# Patient Record
Sex: FEMALE | Race: OTHER | HISPANIC OR LATINO
[De-identification: names, ages, dates, MRNs, and addresses within clinical notes are randomized per-mention and may not be internally consistent; named-entity substitution may affect disease eponyms.]

---

## 2021-09-29 PROBLEM — Z00.00 ENCOUNTER FOR PREVENTIVE HEALTH EXAMINATION: Status: ACTIVE | Noted: 2021-09-29

## 2021-10-06 ENCOUNTER — APPOINTMENT (OUTPATIENT)
Dept: NEUROLOGY | Facility: CLINIC | Age: 56
End: 2021-10-06

## 2023-02-22 ENCOUNTER — NON-APPOINTMENT (OUTPATIENT)
Age: 58
End: 2023-02-22

## 2023-02-23 ENCOUNTER — NON-APPOINTMENT (OUTPATIENT)
Age: 58
End: 2023-02-23

## 2023-02-28 ENCOUNTER — APPOINTMENT (OUTPATIENT)
Dept: COLORECTAL SURGERY | Facility: CLINIC | Age: 58
End: 2023-02-28
Payer: MEDICARE

## 2023-02-28 VITALS
DIASTOLIC BLOOD PRESSURE: 89 MMHG | BODY MASS INDEX: 26.03 KG/M2 | HEIGHT: 66 IN | HEART RATE: 112 BPM | SYSTOLIC BLOOD PRESSURE: 142 MMHG | TEMPERATURE: 98.4 F | WEIGHT: 162 LBS

## 2023-02-28 DIAGNOSIS — N81.6 RECTOCELE: ICD-10-CM

## 2023-02-28 PROCEDURE — 46600 DIAGNOSTIC ANOSCOPY SPX: CPT

## 2023-02-28 PROCEDURE — 99203 OFFICE O/P NEW LOW 30 MIN: CPT | Mod: 25

## 2023-02-28 RX ORDER — DEXLANSOPRAZOLE 60 MG/1
60 CAPSULE, DELAYED RELEASE ORAL
Refills: 0 | Status: ACTIVE | COMMUNITY

## 2023-02-28 RX ORDER — AMLODIPINE BESYLATE AND BENAZEPRIL HYDROCHLORIDE 5; 10 MG/1; MG/1
5-10 CAPSULE ORAL
Refills: 0 | Status: ACTIVE | COMMUNITY

## 2023-02-28 RX ORDER — CYCLOBENZAPRINE HCL 10 MG
10 TABLET ORAL
Refills: 0 | Status: ACTIVE | COMMUNITY

## 2023-02-28 RX ORDER — ESTRADIOL 1 MG/1
1 TABLET ORAL
Refills: 0 | Status: ACTIVE | COMMUNITY

## 2023-02-28 NOTE — ASSESSMENT
[FreeTextEntry1] : Exam findings and diagnosis were discussed at length with patient.\par Recommendations including increased fiber intake, adequate daily hydration were discussed.\par Avoid constipation and diarrhea, avoid pushing/straining.\par Referral given for pelvic floor physical therapy and biofeedback provided.\par Referral to UROGYN also provided.\par All questions answered, patient expressed understanding and is agreeable to this plan.\par Australian-speaking staff present for duration of patient encounter\par

## 2023-02-28 NOTE — ASSESSMENT
[FreeTextEntry1] : Exam findings and diagnosis were discussed at length with patient.\par Recommendations including increased fiber intake, adequate daily hydration were discussed.\par Avoid constipation and diarrhea, avoid pushing/straining.\par Referral given for pelvic floor physical therapy and biofeedback provided.\par Referral to UROGYN also provided.\par All questions answered, patient expressed understanding and is agreeable to this plan.\par Israeli-speaking staff present for duration of patient encounter\par

## 2023-02-28 NOTE — PHYSICAL EXAM
[FreeTextEntry1] : Medical assistant present for duration of physical examination\par \par General no acute distress, alert and oriented\par Psych calm, pleasant demeanor, responding appropriately to questions\par Nonlabored breathing\par Ambulating without assistance\par Skin normal color and pigment, no visible lesions or rashes\par \par Anorectal Exam:\par Inspection no erythema, induration or fluctuance, no skin excoriation, no fissure, right anterior external hemorrhoid\par KENNA nontender, no masses palpated, no blood on gloved finger, Rectocele anteriorly\par \par Procedure: Anoscopy\par \par Pre procedure Diagnosis: rectocele\par Post procedure Diagnosis: rectocele\par Anesthesia: none\par Estimated blood loss: none\par Specimen: none\par Complications: none\par \par Consent obtained. Anoscopy was performed by passing a lighted anoscope with lubricant jelly into the anal canal and the entire anal mucosal surface was inspected. Findings included no fissure, mild internal hemorrhoids, no visible masses or lesions in anal canal\par \par Patient tolerated examination and procedure well.\par \par \par

## 2023-02-28 NOTE — HISTORY OF PRESENT ILLNESS
[FreeTextEntry1] : 58yo female presents for initial evaluation\par \par Referred by PCP Dr Ruelas - outside records from PCP reviewed\par Reason for referral "difficulty defecating"\par \par PMH HTN, menopause, anemia, diverticulitis, GERD, DUMONT, fibroids\par Meds amlodipine, benazepril, dexlansoprazole, cyclobenzaprine, estradiol\par \par PSH partial hysterectomy, right oophorectomy (2002), completion hysterectomy (2014), cholecystectomy , surgery for diverticulitis (ruptured diverticuli) (2007), left knee arthroscopy (2011), s/p knee injection w/ hyaluronic acid and nerve block w/ lidocaine (2/27/23)\par \par Denies family history of CRC or IBD\par \par H/o NSVDx3\par \par Never a  smoker\par \par Colonoscopy 8/17/2020 per PCP records - nonbleeding internal hemorrhoids\par \par Pt reports while in [facility], [place] last week had diarrhea and some BRB on TP and felt protruding tissue that reduced on its own. She experienced some discomfort, but denies pain, itching or burning. Now has tissue present, but denies discomfort. She has been prescribed hemorrhoidal cream in the past year w/ improvement in symptoms, but felt strange inserting applicator and "felt air." She is not using any topical creams at present\par \par Also admits to pressing on perineum to assist w/ BMs which she has been doing for at least the last two years. She saw GYN regarding the complaint, but told her everything was fine. She has not seen UROL or done pelvic floor PT. Reports she had change in bowel habits after diverticulosis surgery\par \par BH: 1-2 times per day, mostly formed. \par Reports adequate dietary fiber intake\par Was taking fiber supplement, but felt it worsened her bloating and gastritis symptoms so she stopped. Last EGD 4/2022 and on dexlansoprazole\par Denies ASA/NSAID use

## 2023-02-28 NOTE — HISTORY OF PRESENT ILLNESS
[FreeTextEntry1] : 56yo female presents for initial evaluation\par \par Referred by PCP Dr Ruelas - outside records from PCP reviewed\par Reason for referral "difficulty defecating"\par \par PMH HTN, menopause, anemia, diverticulitis, GERD, DUMONT, fibroids\par Meds amlodipine, benazepril, dexlansoprazole, cyclobenzaprine, estradiol\par \par PSH partial hysterectomy, right oophorectomy (2002), completion hysterectomy (2014), cholecystectomy , surgery for diverticulitis (ruptured diverticuli) (2007), left knee arthroscopy (2011), s/p knee injection w/ hyaluronic acid and nerve block w/ lidocaine (2/27/23)\par \par Denies family history of CRC or IBD\par \par H/o NSVDx3\par \par Never a  smoker\par \par Colonoscopy 8/17/2020 per PCP records - nonbleeding internal hemorrhoids\par \par Pt reports while in [facility], [place] last week had diarrhea and some BRB on TP and felt protruding tissue that reduced on its own. She experienced some discomfort, but denies pain, itching or burning. Now has tissue present, but denies discomfort. She has been prescribed hemorrhoidal cream in the past year w/ improvement in symptoms, but felt strange inserting applicator and "felt air." She is not using any topical creams at present\par \par Also admits to pressing on perineum to assist w/ BMs which she has been doing for at least the last two years. She saw GYN regarding the complaint, but told her everything was fine. She has not seen UROL or done pelvic floor PT. Reports she had change in bowel habits after diverticulosis surgery\par \par BH: 1-2 times per day, mostly formed. \par Reports adequate dietary fiber intake\par Was taking fiber supplement, but felt it worsened her bloating and gastritis symptoms so she stopped. Last EGD 4/2022 and on dexlansoprazole\par Denies ASA/NSAID use

## 2023-07-19 ENCOUNTER — APPOINTMENT (OUTPATIENT)
Dept: ORTHOPEDIC SURGERY | Facility: CLINIC | Age: 58
End: 2023-07-19
Payer: MEDICARE

## 2023-07-19 VITALS — WEIGHT: 162 LBS | RESPIRATION RATE: 16 BRPM | BODY MASS INDEX: 25.43 KG/M2 | HEIGHT: 67 IN

## 2023-07-19 DIAGNOSIS — S83.241S OTHER TEAR OF MEDIAL MENISCUS, CURRENT INJURY, RIGHT KNEE, SEQUELA: ICD-10-CM

## 2023-07-19 DIAGNOSIS — Z78.9 OTHER SPECIFIED HEALTH STATUS: ICD-10-CM

## 2023-07-19 DIAGNOSIS — Z72.3 LACK OF PHYSICAL EXERCISE: ICD-10-CM

## 2023-07-19 DIAGNOSIS — M17.11 UNILATERAL PRIMARY OSTEOARTHRITIS, RIGHT KNEE: ICD-10-CM

## 2023-07-19 DIAGNOSIS — S83.242A OTHER TEAR OF MEDIAL MENISCUS, CURRENT INJURY, LEFT KNEE, INITIAL ENCOUNTER: ICD-10-CM

## 2023-07-19 DIAGNOSIS — Z86.39 PERSONAL HISTORY OF OTHER ENDOCRINE, NUTRITIONAL AND METABOLIC DISEASE: ICD-10-CM

## 2023-07-19 PROCEDURE — 73564 X-RAY EXAM KNEE 4 OR MORE: CPT | Mod: LT

## 2023-07-19 PROCEDURE — 99214 OFFICE O/P EST MOD 30 MIN: CPT

## 2023-07-19 RX ORDER — AMLODIPINE BESYLATE 5 MG/1
TABLET ORAL
Refills: 0 | Status: ACTIVE | COMMUNITY

## 2023-07-19 RX ORDER — ICOSAPENT ETHYL 500 MG/1
CAPSULE ORAL
Refills: 0 | Status: ACTIVE | COMMUNITY

## 2023-07-19 RX ORDER — DICLOFENAC SODIUM 75 MG/1
TABLET, DELAYED RELEASE ORAL
Refills: 0 | Status: ACTIVE | COMMUNITY

## 2023-07-19 RX ORDER — ESTRADIOL 10 UG/1
TABLET, FILM COATED VAGINAL
Refills: 0 | Status: ACTIVE | COMMUNITY

## 2023-07-26 ENCOUNTER — RESULT REVIEW (OUTPATIENT)
Age: 58
End: 2023-07-26

## 2023-07-26 ENCOUNTER — OUTPATIENT (OUTPATIENT)
Dept: OUTPATIENT SERVICES | Facility: HOSPITAL | Age: 58
LOS: 1 days | End: 2023-07-26
Payer: MEDICARE

## 2023-07-26 ENCOUNTER — APPOINTMENT (OUTPATIENT)
Dept: MRI IMAGING | Facility: CLINIC | Age: 58
End: 2023-07-26

## 2023-07-26 PROCEDURE — 73721 MRI JNT OF LWR EXTRE W/O DYE: CPT | Mod: 26,LT

## 2023-08-02 ENCOUNTER — TRANSCRIPTION ENCOUNTER (OUTPATIENT)
Age: 58
End: 2023-08-02

## 2023-08-09 ENCOUNTER — APPOINTMENT (OUTPATIENT)
Dept: ORTHOPEDIC SURGERY | Facility: CLINIC | Age: 58
End: 2023-08-09
Payer: MEDICARE

## 2023-08-09 VITALS
HEART RATE: 79 BPM | SYSTOLIC BLOOD PRESSURE: 153 MMHG | DIASTOLIC BLOOD PRESSURE: 82 MMHG | WEIGHT: 162 LBS | HEIGHT: 67 IN | BODY MASS INDEX: 25.43 KG/M2 | OXYGEN SATURATION: 97 %

## 2023-08-09 DIAGNOSIS — Z01.818 ENCOUNTER FOR OTHER PREPROCEDURAL EXAMINATION: ICD-10-CM

## 2023-08-09 DIAGNOSIS — R82.90 UNSPECIFIED ABNORMAL FINDINGS IN URINE: ICD-10-CM

## 2023-08-09 DIAGNOSIS — R79.1 ABNORMAL COAGULATION PROFILE: ICD-10-CM

## 2023-08-09 DIAGNOSIS — Z22.322 CARRIER OR SUSPECTED CARRIER OF METHICILLIN RESISTANT STAPHYLOCOCCUS AUREUS: ICD-10-CM

## 2023-08-09 DIAGNOSIS — E83.10 DISORDER OF IRON METABOLISM, UNSPECIFIED: ICD-10-CM

## 2023-08-09 DIAGNOSIS — M17.12 UNILATERAL PRIMARY OSTEOARTHRITIS, LEFT KNEE: ICD-10-CM

## 2023-08-09 PROCEDURE — 99214 OFFICE O/P EST MOD 30 MIN: CPT

## 2023-08-16 ENCOUNTER — APPOINTMENT (OUTPATIENT)
Dept: CT IMAGING | Facility: HOSPITAL | Age: 58
End: 2023-08-16

## 2023-08-16 ENCOUNTER — APPOINTMENT (OUTPATIENT)
Dept: ORTHOPEDIC SURGERY | Facility: CLINIC | Age: 58
End: 2023-08-16
Payer: MEDICARE

## 2023-08-16 ENCOUNTER — LABORATORY RESULT (OUTPATIENT)
Age: 58
End: 2023-08-16

## 2023-08-16 ENCOUNTER — NON-APPOINTMENT (OUTPATIENT)
Age: 58
End: 2023-08-16

## 2023-08-16 ENCOUNTER — OUTPATIENT (OUTPATIENT)
Dept: OUTPATIENT SERVICES | Facility: HOSPITAL | Age: 58
LOS: 1 days | End: 2023-08-16
Payer: MEDICARE

## 2023-08-16 VITALS
OXYGEN SATURATION: 98 % | BODY MASS INDEX: 25.43 KG/M2 | DIASTOLIC BLOOD PRESSURE: 82 MMHG | HEART RATE: 83 BPM | HEIGHT: 67 IN | SYSTOLIC BLOOD PRESSURE: 116 MMHG | WEIGHT: 162 LBS

## 2023-08-16 DIAGNOSIS — Z82.61 FAMILY HISTORY OF ARTHRITIS: ICD-10-CM

## 2023-08-16 DIAGNOSIS — M79.18 MYALGIA, OTHER SITE: ICD-10-CM

## 2023-08-16 DIAGNOSIS — M54.6 PAIN IN THORACIC SPINE: ICD-10-CM

## 2023-08-16 LAB
ALBUMIN SERPL ELPH-MCNC: 4.3 G/DL
ALP BLD-CCNC: 89 U/L
ALT SERPL-CCNC: 18 U/L
ANION GAP SERPL CALC-SCNC: 12 MMOL/L
APTT BLD: 31.2 SEC
AST SERPL-CCNC: 21 U/L
BILIRUB SERPL-MCNC: 0.2 MG/DL
BUN SERPL-MCNC: 7 MG/DL
CALCIUM SERPL-MCNC: 9.8 MG/DL
CHLORIDE SERPL-SCNC: 106 MMOL/L
CO2 SERPL-SCNC: 23 MMOL/L
CREAT SERPL-MCNC: 0.71 MG/DL
EGFR: 97 ML/MIN/1.73M2
GLUCOSE SERPL-MCNC: 81 MG/DL
INR PPP: 1.05 RATIO
MRSA SPEC QL CULT: POSITIVE
POTASSIUM SERPL-SCNC: 5 MMOL/L
PREALB SERPL NEPH-MCNC: 18 MG/DL
PROT SERPL-MCNC: 7.4 G/DL
PT BLD: 11.8 SEC
SODIUM SERPL-SCNC: 142 MMOL/L
STAPH AUREUS (SA): POSITIVE
TRANSFERRIN SERPL-MCNC: 329 MG/DL

## 2023-08-16 PROCEDURE — 72110 X-RAY EXAM L-2 SPINE 4/>VWS: CPT

## 2023-08-16 PROCEDURE — 73700 CT LOWER EXTREMITY W/O DYE: CPT | Mod: 26,LT

## 2023-08-16 PROCEDURE — 73700 CT LOWER EXTREMITY W/O DYE: CPT

## 2023-08-16 PROCEDURE — 99215 OFFICE O/P EST HI 40 MIN: CPT

## 2023-08-16 NOTE — CONSULT LETTER
[FreeTextEntry2] : Dr. Figueroa [FreeTextEntry1] : I had the privilege of evaluating your patient today. I have enclosed my office note for your reference. If you have any questions, concerns or further input, please do not hesitate to contact me.  Thank you for allowing mew to participate in the care of your patient.  Sincerely, Yariel Shepard MD

## 2023-08-16 NOTE — REVIEW OF SYSTEMS
[Joint Pain] : joint pain [Joint Swelling] : joint swelling [Negative] : Heme/Lymph [Arthralgia] : arthralgia [Joint Stiffness] : joint stiffness [Fever] : no fever [Chills] : no chills

## 2023-08-16 NOTE — PHYSICAL EXAM
[de-identified] : General: Patient is a well-appearing female in no apparent distress. She is alert and oriented x 3. Vital signs are within normal limits.  No sign of fevers or infectious symptoms.   Hygiene: Excellent HEENT: Atraumatic with no asymmetry.  Neck motion is normal. No overt hearing deficits. Pulmonary: Breathing comfortably. Gastrointestinal: Is obese.   Psych: Responding appropriately with appropriate affect. Patient does not demonstrate tangential thought, perseveration or anxiety. Vascular: No rashes or obvious skin abnormalities in either lower extremity. Capillary refill is <2sec. Good distal perfusion. Neurovascular: Varicose absent. 5/5 strength with hip flexion, knee extension, ankle dorsiflexion, ankle plantar flexion, and EHL. Sensation is intact over the lower extremity in L2-S1 nerve distributions. 2+ dorsalis pedis and posterior tibial pulses   [de-identified] : Gait: She walks with a severely antalgic gait which is stifflegged on the left side  Inspection: Knee appears unremarkable No ulcerations observed  Palpation: No tenderness to palpation Tenderness to palpation of the medial joint line  Range of Motion:		 Right: 0-125			 Left: 0-125  Painful ROM on the left. Bilateral knee stable to varus/valgus and ant/post stress  Both hips are stable no sign of dislocation or subluxation on exam with good pain free ROM.   [de-identified] : Previous x-rays and MRI reviewed.  The x-rays show some mild to moderate degenerative changes in the medial compartment femoral compartment.  MRI shows some focal areas of grade 4 changes along with meniscal pathology in the medial compartment with some focal areas of grade 4 changes in the patellofemoral compartment as well.

## 2023-08-16 NOTE — DISCUSSION/SUMMARY
Date of service: 02/14/22       Chief Complaint   Patient presents with   • Blood Pressure          HPI:   Maria Del Rosario Bradford is a 62 year old with hypertension, asthma, obesity, history of hysterectomy, allergic rhinitis who presents for healthcare maintenance as well as follow-up on uncontrolled hypertension.  Patient has seen other fellow colleagues and currently has been on regimen of metoprolol, losartan, hydrochlorothiazide, amlodipine.  Blood pressure elevated over the last few readings, initially 142/85 today, 124/78 on recheck.  Patient states that she has been taking blood pressure medication, has been trying to avoid salty snacks in her diet.  Has not had recent lab work, agreeable to completing lab work today.    States that she drinks a lot of water, stays hydrated.  Discussed need for consistent exercise and regimen.    Surgical history: Hysterectomy    Medical history: As mentioned today    Social history: Non-smoker    Family history: Strong family history of hypertension diabetes      Healthcare maintenance:  Annual preventive lab work: Due, ordered  Mammogram: Due, ordered  Colon cancer screening: Due, referred  COVID-19 vaccination: Advised to complete series    There are no further complaints.     Review:    Current Outpatient Medications   Medication Sig Dispense Refill   • albuterol 108 (90 Base) MCG/ACT inhaler Inhale 2 puffs into the lungs every 4 hours as needed for Shortness of Breath or Wheezing. 1 each 1   • hydrochlorothiazide (HYDRODIURIL) 25 MG tablet Take 1 tablet by mouth daily. 30 tablet 0   • metoPROLOL succinate (TOPROL-XL) 25 MG 24 hr tablet Take 1 tablet by mouth daily. 30 tablet 0   • amLODIPine (NORVASC) 10 MG tablet Take 1 tablet by mouth daily. 30 tablet 1   • losartan (COZAAR) 100 MG tablet TAKE 1 TABLET BY MOUTH DAILY 90 tablet 0     No current facility-administered medications for this visit.     ALLERGIES:   Allergen Reactions   • Chocolate   (Food Or Med) HIVES     Past  Medical History:   Diagnosis Date   • Essential (primary) hypertension         HYSTERECTOMY                                                Family History   Problem Relation Age of Onset   • Diabetes Mother    • Hypertension Mother    • Heart disease Mother    • Stroke Mother    • Diabetes Father    • Hypertension Father    • Heart disease Father    • Stroke Father        ROS:  Review of Systems   Constitutional: Negative for chills and fever.   HENT: Negative for sore throat.    Respiratory: Negative for cough, shortness of breath and wheezing.    Cardiovascular: Negative for chest pain.   Gastrointestinal: Negative for constipation, diarrhea and vomiting.   Genitourinary: Negative for frequency.   Musculoskeletal: Negative for joint pain.   Skin: Negative for rash.   Neurological: Negative for speech change and weakness.           Visit Vitals  /78 (BP Location: LUE - Left upper extremity)   Pulse 82   Temp 97.1 °F (36.2 °C) (Tympanic)   Resp 20   Ht 4' 11\" (1.499 m)   Wt 70 kg (154 lb 5.2 oz)   SpO2 97%   BMI 31.17 kg/m²        Physical Exam  Constitutional:       Appearance: Normal appearance.   HENT:      Head: Normocephalic and atraumatic.      Right Ear: Tympanic membrane and ear canal normal.      Left Ear: Tympanic membrane and ear canal normal.      Nose: Congestion present.      Mouth/Throat:      Pharynx: Oropharyngeal exudate present.      Neck: Normal range of motion and neck supple.   Cardiovascular:      Rate and Rhythm: Normal rate and regular rhythm.      Pulses: Normal pulses.      Heart sounds: Normal heart sounds.   Pulmonary:      Effort: Pulmonary effort is normal.      Breath sounds: Normal breath sounds.   Abdominal:      General: Abdomen is flat.      Palpations: Abdomen is soft.   Musculoskeletal:         General: No swelling.      Right lower leg: No edema.      Left lower leg: No edema.   Skin:     Capillary Refill: Capillary refill takes less than 2 seconds.   Neurological:       General: No focal deficit present.      Mental Status: She is alert and oriented to person, place, and time.         Assessment/Plan:  Assessment     Maria Del Rosario was seen today for blood pressure.    Diagnoses and all orders for this visit:    Healthcare maintenance  -     Cancel: CBC WITH DIFFERENTIAL; Future  -     Cancel: COMPREHENSIVE METABOLIC PANEL; Future  -     Cancel: GLYCOHEMOGLOBIN; Future  -     Cancel: LIPID PANEL WITH REFLEX; Future  -     Cancel: THYROID STIMULATING HORMONE REFLEX; Future  -     THYROID STIMULATING HORMONE REFLEX; Future  -     LIPID PANEL WITH REFLEX; Future  -     GLYCOHEMOGLOBIN; Future  -     COMPREHENSIVE METABOLIC PANEL; Future  -     CBC WITH DIFFERENTIAL; Future    Screen for colon cancer  -     SERVICE TO GASTROENTEROLOGY    Encounter for screening mammogram for malignant neoplasm of breast  -     MAMMO SCREENING BILATERAL W CHATA; Future    Uncontrolled hypertension  -     Cancel: THYROID STIMULATING HORMONE REFLEX; Future  -     Cancel: MICROALBUMIN URINE RANDOM; Future  -     hydrochlorothiazide (HYDRODIURIL) 25 MG tablet; Take 1 tablet by mouth daily.  -     metoPROLOL succinate (TOPROL-XL) 25 MG 24 hr tablet; Take 1 tablet by mouth daily.  -     MICROALBUMIN URINE RANDOM; Future  -     THYROID STIMULATING HORMONE REFLEX; Future    Class 1 obesity due to excess calories without serious comorbidity with body mass index (BMI) of 33.0 to 33.9 in adult  --Establish regimen of daily cardiovascular activity  --Follow heart healthy low carbohydrate, low-salt diet  --Track calories using smartphone henna or manually  --Discussed our goal is gradual weight loss that is sustainable rather than quick temporary weight loss  --Emphasized importance of hydration  --Can consider nutritionist evaluation  --Should keep track of weights at home on a weekly basis    Need for hepatitis C screening test  -     Cancel: HEPATITIS C ANTIBODY WITH REFLEX; Future  -     HEPATITIS C ANTIBODY WITH REFLEX;  [de-identified] : Right knee arthritis.  I had a long discussion with the patient regarding knee arthritis / degenerative disease and treatment options. We reviewed the nature and etiology of degenerative knee degenerative disease.  We discussed the spectrum of symptomatic treatments. Our discussion included the use of appropriate exercises, activity modifications, weight reduction and maintenance, appropriate medication use, use of assistive devices, role of injections and avoidance of high impact activities.  Given the clinical symptoms, physical exam and imaging findings, we discussed the possibility of knee replacement surgery.  We reviewed the elective quality of life nature of the procedure and the details of the surgery, approach and the implants used, using the model  in the clinic. This included discussion of the material and fixation options. We also discussed the risks, benefits and expected and potential outcomes at length.  The details of those risks are below.  Category 1 includes risks that could occur in association with any operation. They include heart attack, stroke, blood clot and pulmonary embolism, wound infection, transfusion reaction, drug allergy, and complications related to anesthesia. This list is not intended to be complete but only to convey a broad range of general medical risks to be aware of.  Blood clots may lead to a block in circulation. A blood clot that completely blocks a large artery can lead to gangrene. If this happens an amputation may be required. Blood clots in leg veins lead to pain and swelling. If part of the clot breaks off it can travel to the brain and lead to a stroke. A clot can also travel to the lung, resulting in a pulmonary embolism. Medication after surgery will minimize but not eliminate these complications.  Category 2 is a list of risks and complications specifically related to knee replacement. This list is not complete but is intended to make the patient aware of the kinds of implant-related risks and complications that might occur.    1. If the device gets loose or wears out further surgery may be required to revise the prosthesis. 2. If an infection develops, further surgery to washout the joint, remove or replace parts, or insert an antibiotic spacer may be required 3. Muscle, Tendon, Nerve and blood vessel damage may result as a consequence of mobilization of the joint or dissection near these structures. These injuries can lead to weakness, numbness and paralysis. The damage may be temporary or permanent. The recovery process can be long and may require further procedures.   4. Dislocations and instability may also require further surgery.   5. Periprosthetic fracture requiring revision surgery. 6. Leg length discrepancy  7. Stiffness 8. Wound complications requiring either local wound care, revision surgery, or plastic surgery consultation  9. Chronic pain requiring pain management  At this point in time the patient feels would like to proceed with surgery.  We did review the relative risks and benefits of partial versus total knee replacement given the nature of her pain as well as the findings in the patellofemoral joint on MRI she would like a total knee arthroplasty .  She understands that that will relieve pain related to her knee arthritis but not due to other etiologies including her spine.  Given she does have significant low back pain she does have pain that radiates from the knee all the way to the foot we will have her evaluated by the spine service to ensure that they do not think the spine is the major contributor to her pain.  We discussed the procedure and recovery at length using the model in the clinic is noted.  We would plan a home discharge she will have support from her daughter who is with her today and lives with her.  Will plan robotic assistance with preoperative CT scan as well.  She expressed understanding of all this and desire to proceed.  All questions were answered.  Plan: Left robotically assisted total knee arthroplasty  Equipment: [id number] knee; AMINAH Robot  Evaluations: Spine; PCP; [facility]  Addendum (8/16/23) I reviewed x-rays and MRI with the sports team for the patient (Dr. Avilez).  He confirmed that he feels there is too much arthritis to consider arthroscopic treatment of any meniscal pathology and this would likely worsen the situation and not benefit her.  Plan as above. Future    Mild intermittent asthma without complication  -     albuterol 108 (90 Base) MCG/ACT inhaler; Inhale 2 puffs into the lungs every 4 hours as needed for Shortness of Breath or Wheezing.        Huong Shipman D.O.  Primary Care   02/14/22  [facility]  [address] [place] [id number]

## 2023-08-16 NOTE — HISTORY OF PRESENT ILLNESS
[de-identified] : ASHLIE GUTIÉRREZ is a pleasant 58 year female .  She is seen today with her daughter who provided initial  at her request as well as with an official ..  She is referred from the sports medicine service.  ASHLIE GUTIÉRREZ complains of left knee pain over the past several years. Pain is located in the medial region of the knee and radiates to laterally. She denies prior injury or trauma. She notes the pain is worse with activity- walking/taking stairs and rates it 10 out of 10 at its worst. She is limping due to the pain. She denies mechanical symptoms including catching, locking, buckling.  She does have rest/nighttime pain. She  reports difficulty taking public transportation due to the excessive stairs. She has been treated non-surgically with ice/heat, physical therapy, anti-inflammatory medications (Advil/Aleve/Mobic), weight loss, activity modification, ambulatory assistive devices: cane/walker/crutches, and intra-articular injection (steroid/HA) (last was on) which lasted for a few week(s) but was not durable. The left/right knee pain significantly interferes with their ADLs and quality of life. Denies any fevers and chills.

## 2023-08-17 ENCOUNTER — NON-APPOINTMENT (OUTPATIENT)
Age: 58
End: 2023-08-17

## 2023-08-17 PROBLEM — M54.6 THORACIC BACK PAIN: Status: ACTIVE | Noted: 2023-08-17

## 2023-08-17 PROBLEM — M79.18 MYOFASCIAL PAIN SYNDROME OF LUMBAR SPINE: Status: ACTIVE | Noted: 2023-08-17

## 2023-08-17 LAB
APPEARANCE: ABNORMAL
BILIRUBIN URINE: NEGATIVE
BLOOD URINE: ABNORMAL
COLOR: YELLOW
GLUCOSE QUALITATIVE U: NEGATIVE MG/DL
KETONES URINE: NEGATIVE MG/DL
LEUKOCYTE ESTERASE URINE: NEGATIVE
NITRITE URINE: NEGATIVE
PH URINE: 5.5
PROTEIN URINE: NEGATIVE MG/DL
SPECIFIC GRAVITY URINE: 1.01
UROBILINOGEN URINE: 0.2 MG/DL

## 2023-08-17 NOTE — PHYSICAL EXAM
[Antalgic] : antalgic [UE/LE] : Sensory: Intact in bilateral upper & lower extremities [Knee] : patellar 2+ and symmetric bilaterally [Ankle] : ankle 2+ and symmetric bilaterally [Normal Touch] : sensation intact for touch [Normal Proprioception] : sensation intact for proprioception [Normal] : No costovertebral angle tenderness and no spinal tenderness [de-identified] : 4 view lumbar spine x-ray 8/16/2023 Ortho PACS: This space heights maintained.  Moderate hyper lordosis.  No evidence of instability or listhesis.  Coronal alignment is well-maintained.  Left leg length discrepancy approximately 1.5 cm longer than right.

## 2023-08-17 NOTE — ASSESSMENT
[FreeTextEntry1] : 58-year-old female with chronic myofascial type lower thoracic and lumbar pain without radiculopathy or neurologic abnormality or spinal instability

## 2023-08-17 NOTE — HISTORY OF PRESENT ILLNESS
[de-identified] : 58-year-old female presents as new patient for evaluation of thoracic and lumbar back pain.  She describes several years of atraumatic onset of a aching stiffness that affects the lower thoracic region as well as the lumbar region diffusely.  This pain bothers her if she is walking for an extended period of time or occasionally if she is changing positions such as getting up from a chair or bending forward to pick something up off the ground.  She denies any radiating pain down the extremities or any associated numbness tingling or weakness.  Does have significant left knee pain the setting of advanced osteoarthritis as well as left medial heel pain consistent with plantar fasciitis. Has planned left total knee replacement with Dr. Shepard in the coming weeks

## 2023-08-17 NOTE — DISCUSSION/SUMMARY
[de-identified] : At this time I do feel like the limiting factor from a pain and functional status perspective is the symptomatic left knee osteoarthritis and have certainly recommended moving forward with a left total knee replacement as planned with Dr. Shepard.  I have not recommended any spinal precautions restrictions or additional work-up necessary prior to undergoing this procedure.  Do feel that correcting her left knee mechanics and pain with associated rehab is a large chance of helping her low back pain.  She does continue to bother her significantly after she recovers from her total knee replacement I recommended to follow-up to consider additional imaging and/or treatment as needed.  I have spent greater than 60 minutes preparing to see the patient, collecting relevant history, performing a thorough history and physical examination, counseling the patient regarding my findings ordering the appropriate therapies and tests, communicating with other relevant healthcare professionals, documenting my encounter and coordinating care.

## 2023-08-22 ENCOUNTER — NON-APPOINTMENT (OUTPATIENT)
Age: 58
End: 2023-08-22

## 2023-08-31 RX ORDER — SODIUM CHLORIDE 9 MG/ML
1000 INJECTION, SOLUTION INTRAVENOUS
Refills: 0 | Status: DISCONTINUED | OUTPATIENT
Start: 2023-09-05 | End: 2023-09-08

## 2023-08-31 RX ORDER — MAGNESIUM HYDROXIDE 400 MG/1
30 TABLET, CHEWABLE ORAL DAILY
Refills: 0 | Status: DISCONTINUED | OUTPATIENT
Start: 2023-09-05 | End: 2023-09-08

## 2023-08-31 RX ORDER — ACETAMINOPHEN 500 MG
1000 TABLET ORAL EVERY 8 HOURS
Refills: 0 | Status: DISCONTINUED | OUTPATIENT
Start: 2023-09-05 | End: 2023-09-08

## 2023-08-31 RX ORDER — ONDANSETRON 8 MG/1
4 TABLET, FILM COATED ORAL EVERY 6 HOURS
Refills: 0 | Status: DISCONTINUED | OUTPATIENT
Start: 2023-09-05 | End: 2023-09-08

## 2023-08-31 RX ORDER — SENNA PLUS 8.6 MG/1
2 TABLET ORAL AT BEDTIME
Refills: 0 | Status: DISCONTINUED | OUTPATIENT
Start: 2023-09-05 | End: 2023-09-08

## 2023-08-31 RX ORDER — POLYETHYLENE GLYCOL 3350 17 G/17G
17 POWDER, FOR SOLUTION ORAL AT BEDTIME
Refills: 0 | Status: DISCONTINUED | OUTPATIENT
Start: 2023-09-05 | End: 2023-09-08

## 2023-08-31 RX ORDER — ASPIRIN/CALCIUM CARB/MAGNESIUM 324 MG
81 TABLET ORAL
Refills: 0 | Status: DISCONTINUED | OUTPATIENT
Start: 2023-09-06 | End: 2023-09-08

## 2023-08-31 RX ORDER — FAMOTIDINE 10 MG/ML
20 INJECTION INTRAVENOUS DAILY
Refills: 0 | Status: DISCONTINUED | OUTPATIENT
Start: 2023-09-05 | End: 2023-09-08

## 2023-08-31 RX ORDER — CELECOXIB 200 MG/1
200 CAPSULE ORAL EVERY 12 HOURS
Refills: 0 | Status: DISCONTINUED | OUTPATIENT
Start: 2023-09-05 | End: 2023-09-08

## 2023-09-01 RX ORDER — POVIDONE-IODINE 5 %
1 AEROSOL (ML) TOPICAL ONCE
Refills: 0 | Status: COMPLETED | OUTPATIENT
Start: 2023-09-05 | End: 2023-09-05

## 2023-09-01 NOTE — H&P ADULT - PROBLEM SELECTOR PLAN 1
Admit to Orthopaedic Service.  Presents today for elective Left TKR  Pt medically stable and cleared for procedure today by Dr. Puga

## 2023-09-01 NOTE — H&P ADULT - HISTORY OF PRESENT ILLNESS
59yo female with Left knee pain x     Presents today for elective Left Total Knee Arthroplasty w/ Dr. Shepard 57yo female with Left knee pain x chronic. Pt denies acute preceding trauma/injury. Pt takes tylenol as needed for her localized knee pain. She denies numbness/tingling/weakness of bilateral lower extremities. She does not ambulate with an assistive device at baseline. Denies DVT hx; denies CP, SOB, N/V, tactile fevers, calf pain.     Presents today for elective Left Total Knee Arthroplasty w/ Dr. Shepard   #[id number] Catherine for history/exam and consent

## 2023-09-01 NOTE — H&P ADULT - NSICDXPASTSURGICALHX_GEN_ALL_CORE_FT
PAST SURGICAL HISTORY:  H/O colectomy     History of cholecystectomy     History of partial hysterectomy     Uterine myoma

## 2023-09-01 NOTE — ASU PATIENT PROFILE, ADULT - FALL HARM RISK - UNIVERSAL INTERVENTIONS
Bed in lowest position, wheels locked, appropriate side rails in place/Call bell, personal items and telephone in reach/Instruct patient to call for assistance before getting out of bed or chair/Non-slip footwear when patient is out of bed/[place] to call system/Physically safe environment - no spills, clutter or unnecessary equipment/Purposeful Proactive Rounding/Room/bathroom lighting operational, light cord in reach

## 2023-09-01 NOTE — H&P ADULT - NSHPLABSRESULTS_GEN_ALL_CORE
Preop CBC, BMP, PT/PTT/INR, UA within normal limits- reviewed by medical clearance.  Cr: 0.71  Preop EKG: NSR, 73bpm, reviewed by medical clearance.  CXR: Within normal limits, per medical clearance.  MRSA swab positive  3M DOS Preop CBC, BMP, PT/PTT/INR, UA within normal limits- reviewed by medical clearance.  Cr: 0.71  Preop EKG: NSR, 73bpm, reviewed by medical clearance.  CXR: Within normal limits, per medical clearance.  MRSA swab positive   3M DOS

## 2023-09-01 NOTE — ASU PATIENT PROFILE, ADULT - NSICDXPASTMEDICALHX_GEN_ALL_CORE_FT
PAST MEDICAL HISTORY:  Anemia     Dyslipidemia     GERD (gastroesophageal reflux disease)     History of diverticulosis     HTN (hypertension)     Inflammatory arthropathy     Prediabetes

## 2023-09-01 NOTE — H&P ADULT - NSHPPHYSICALEXAM_GEN_ALL_CORE
Gen: Alert and oriented, NAD  MSK: Decreased ROM to left knee 2/2 pain     Rest of PE per medical clearance note Gen: Alert and oriented, NAD  MSK: Decreased ROM to left knee 2/2 pain   Skin warm and well perfused, no visible wounds/erythema/ecchymoses  EHL/FHL/TA/GS 5/5 motor strength bilaterally   SLT in tact to distal BLE   DP pulses palpable bilaterally   Remainder of PE per medical clearance note

## 2023-09-01 NOTE — H&P ADULT - NSICDXPASTMEDICALHX_GEN_ALL_CORE_FT
PAST MEDICAL HISTORY:  Anemia     Dyslipidemia     GERD (gastroesophageal reflux disease)     History of diverticulosis     HTN (hypertension)     Inflammatory arthropathy     Prediabetes     Primary osteoarthritis of left knee

## 2023-09-04 ENCOUNTER — TRANSCRIPTION ENCOUNTER (OUTPATIENT)
Age: 58
End: 2023-09-04

## 2023-09-05 ENCOUNTER — APPOINTMENT (OUTPATIENT)
Dept: ORTHOPEDIC SURGERY | Facility: HOSPITAL | Age: 58
End: 2023-09-05

## 2023-09-05 ENCOUNTER — INPATIENT (INPATIENT)
Facility: HOSPITAL | Age: 58
LOS: 2 days | Discharge: HOME HEALTH | DRG: 470 | End: 2023-09-08
Attending: SPECIALIST | Admitting: SPECIALIST
Payer: MEDICARE

## 2023-09-05 ENCOUNTER — TRANSCRIPTION ENCOUNTER (OUTPATIENT)
Age: 58
End: 2023-09-05

## 2023-09-05 ENCOUNTER — RESULT REVIEW (OUTPATIENT)
Age: 58
End: 2023-09-05

## 2023-09-05 VITALS
DIASTOLIC BLOOD PRESSURE: 85 MMHG | TEMPERATURE: 98 F | HEART RATE: 76 BPM | HEIGHT: 67 IN | WEIGHT: 156.75 LBS | SYSTOLIC BLOOD PRESSURE: 146 MMHG | OXYGEN SATURATION: 98 % | RESPIRATION RATE: 16 BRPM

## 2023-09-05 DIAGNOSIS — Z90.49 ACQUIRED ABSENCE OF OTHER SPECIFIED PARTS OF DIGESTIVE TRACT: Chronic | ICD-10-CM

## 2023-09-05 DIAGNOSIS — M17.12 UNILATERAL PRIMARY OSTEOARTHRITIS, LEFT KNEE: ICD-10-CM

## 2023-09-05 DIAGNOSIS — Z90.711 ACQUIRED ABSENCE OF UTERUS WITH REMAINING CERVICAL STUMP: Chronic | ICD-10-CM

## 2023-09-05 DIAGNOSIS — D25.9 LEIOMYOMA OF UTERUS, UNSPECIFIED: Chronic | ICD-10-CM

## 2023-09-05 PROCEDURE — S2900 ROBOTIC SURGICAL SYSTEM: CPT | Mod: NC

## 2023-09-05 PROCEDURE — 27447 TOTAL KNEE ARTHROPLASTY: CPT | Mod: LT

## 2023-09-05 PROCEDURE — 73560 X-RAY EXAM OF KNEE 1 OR 2: CPT | Mod: 26,LT

## 2023-09-05 DEVICE — MAKO BONE PIN 4MM X 110MM: Type: IMPLANTABLE DEVICE | Status: FUNCTIONAL

## 2023-09-05 DEVICE — INSERT TIB BEARING CS X3 SZ 2 9MM: Type: IMPLANTABLE DEVICE | Status: FUNCTIONAL

## 2023-09-05 DEVICE — IMP PATELLA ASYMMETRIC X3 29X9MM: Type: IMPLANTABLE DEVICE | Status: FUNCTIONAL

## 2023-09-05 DEVICE — MAKO BONE PIN 4MM X 140MM: Type: IMPLANTABLE DEVICE | Status: FUNCTIONAL

## 2023-09-05 DEVICE — FEMORAL CRUCIATE RETAINING: Type: IMPLANTABLE DEVICE | Status: FUNCTIONAL

## 2023-09-05 DEVICE — IMPLANTABLE DEVICE: Type: IMPLANTABLE DEVICE | Status: FUNCTIONAL

## 2023-09-05 DEVICE — BASEPLATE TIB UNIV TRIATHLON SZ 2: Type: IMPLANTABLE DEVICE | Status: FUNCTIONAL

## 2023-09-05 RX ORDER — KETOROLAC TROMETHAMINE 30 MG/ML
30 SYRINGE (ML) INJECTION ONCE
Refills: 0 | Status: DISCONTINUED | OUTPATIENT
Start: 2023-09-05 | End: 2023-09-05

## 2023-09-05 RX ORDER — TRAMADOL HYDROCHLORIDE 50 MG/1
25 TABLET ORAL EVERY 6 HOURS
Refills: 0 | Status: DISCONTINUED | OUTPATIENT
Start: 2023-09-05 | End: 2023-09-06

## 2023-09-05 RX ORDER — TRAMADOL HYDROCHLORIDE 50 MG/1
50 TABLET ORAL EVERY 6 HOURS
Refills: 0 | Status: DISCONTINUED | OUTPATIENT
Start: 2023-09-05 | End: 2023-09-06

## 2023-09-05 RX ORDER — CHLORHEXIDINE GLUCONATE 213 G/1000ML
1 SOLUTION TOPICAL ONCE
Refills: 0 | Status: COMPLETED | OUTPATIENT
Start: 2023-09-05 | End: 2023-09-05

## 2023-09-05 RX ORDER — CEFAZOLIN SODIUM 1 G
2000 VIAL (EA) INJECTION EVERY 8 HOURS
Refills: 0 | Status: COMPLETED | OUTPATIENT
Start: 2023-09-06 | End: 2023-09-06

## 2023-09-05 RX ORDER — ACETAMINOPHEN 500 MG
1000 TABLET ORAL ONCE
Refills: 0 | Status: COMPLETED | OUTPATIENT
Start: 2023-09-05 | End: 2023-09-05

## 2023-09-05 RX ADMIN — Medication 1 APPLICATION(S): at 11:21

## 2023-09-05 RX ADMIN — Medication 1000 MILLIGRAM(S): at 10:55

## 2023-09-05 RX ADMIN — Medication 30 MILLIGRAM(S): at 21:49

## 2023-09-05 RX ADMIN — CHLORHEXIDINE GLUCONATE 1 APPLICATION(S): 213 SOLUTION TOPICAL at 11:21

## 2023-09-05 RX ADMIN — Medication 1000 MILLIGRAM(S): at 22:48

## 2023-09-05 RX ADMIN — TRAMADOL HYDROCHLORIDE 50 MILLIGRAM(S): 50 TABLET ORAL at 20:46

## 2023-09-05 RX ADMIN — Medication 1000 MILLIGRAM(S): at 21:48

## 2023-09-05 RX ADMIN — Medication 30 MILLIGRAM(S): at 22:49

## 2023-09-05 NOTE — BRIEF OPERATIVE NOTE - NSICDXBRIEFPOSTOP_GEN_ALL_CORE_FT
POST-OP DIAGNOSIS:  Osteoarthritis of left knee 05-Sep-2023 19:13:32 s/p left TKA Shelli Jean Baptiste

## 2023-09-05 NOTE — PRE-ANESTHESIA EVALUATION ADULT - NSANTHADDINFOFT_GEN_ALL_CORE
Spinal / PNB for postop pain/ possible GA  R/B/A d/w patient including risks of parasthesia, HA, and nerve injury. All questions answered.

## 2023-09-05 NOTE — BRIEF OPERATIVE NOTE - NSICDXBRIEFPROCEDURE_GEN_ALL_CORE_FT
PROCEDURES:  Arthroplasty, knee, left, total, robot-assisted 05-Sep-2023 19:13:03  Shelli Jean Baptiste

## 2023-09-05 NOTE — PROGRESS NOTE ADULT - SUBJECTIVE AND OBJECTIVE BOX
Ortho Post Op Check    Procedure: L TKA  Surgeon: Devyn    Pt comfortable without complaints, pain controlled  Denies CP, SOB, N/V, numbness/tingling     Vital Signs Last 24 Hrs  T(C): 36.1 (09-05-23 @ 19:00), Max: 36.1 (09-05-23 @ 19:00)  T(F): 97 (09-05-23 @ 19:00), Max: 97 (09-05-23 @ 19:00)  HR: 62 (09-05-23 @ 20:15) (62 - 74)  BP: 150/71 (09-05-23 @ 20:15) (134/69 - 168/94)  BP(mean): 102 (09-05-23 @ 20:15) (94 - 126)  RR: 20 (09-05-23 @ 20:15) (13 - 24)  SpO2: 99% (09-05-23 @ 20:15) (94% - 99%)  I&O's Summary    05 Sep 2023 07:01  -  05 Sep 2023 21:34  --------------------------------------------------------  IN: 150 mL / OUT: 1800 mL / NET: -1650 mL        Physical Exam:  General: Pt Alert and oriented, NAD  LLE  DSG C/D/I, ace  Pulses: 2+ dp, pt pulses, wwp, cap refill <3 sec  Sensation: SILT in sural/saph/sp/dp/tibial distributions  Motor: EHL/FHL/TA/GS firing      Post-op X-Ray: Hardware in place, well aligned    A/P: 58yFemale s/p L TKA with Dr. Shepard on 9/5/23  - Stable  - Pain Control  - f/u AM labs  - DVT ppx: SCDs. asa  - Post op abx: periop ancef  - PT, WBS: WBAT  - Dispo: Pending PT    Aleksey Humphrey, PGY2  Orthopaedic Surgery  [phone]

## 2023-09-06 ENCOUNTER — TRANSCRIPTION ENCOUNTER (OUTPATIENT)
Age: 58
End: 2023-09-06

## 2023-09-06 LAB
ANION GAP SERPL CALC-SCNC: 11 MMOL/L — SIGNIFICANT CHANGE UP (ref 5–17)
BILIRUB SERPL-MCNC: 0.8 MG/DL — SIGNIFICANT CHANGE UP (ref 0.2–1.2)
BUN SERPL-MCNC: 10 MG/DL — SIGNIFICANT CHANGE UP (ref 7–23)
CALCIUM SERPL-MCNC: 9.3 MG/DL — SIGNIFICANT CHANGE UP (ref 8.4–10.5)
CHLORIDE SERPL-SCNC: 102 MMOL/L — SIGNIFICANT CHANGE UP (ref 96–108)
CO2 SERPL-SCNC: 23 MMOL/L — SIGNIFICANT CHANGE UP (ref 22–31)
CREAT SERPL-MCNC: 0.61 MG/DL — SIGNIFICANT CHANGE UP (ref 0.5–1.3)
EGFR: 102 ML/MIN/1.73M2 — SIGNIFICANT CHANGE UP
GLUCOSE SERPL-MCNC: 104 MG/DL — HIGH (ref 70–99)
HCT VFR BLD CALC: 33.6 % — LOW (ref 34.5–45)
HCV AB S/CO SERPL IA: 0.04 S/CO — SIGNIFICANT CHANGE UP
HCV AB SERPL-IMP: SIGNIFICANT CHANGE UP
HGB BLD-MCNC: 10.3 G/DL — LOW (ref 11.5–15.5)
INR BLD: 1.02 — SIGNIFICANT CHANGE UP (ref 0.85–1.18)
MCHC RBC-ENTMCNC: 24.4 PG — LOW (ref 27–34)
MCHC RBC-ENTMCNC: 30.7 GM/DL — LOW (ref 32–36)
MCV RBC AUTO: 79.6 FL — LOW (ref 80–100)
MELD SCORE WITH DIALYSIS: 21 POINTS — SIGNIFICANT CHANGE UP
MELD SCORE WITHOUT DIALYSIS: 7 POINTS — SIGNIFICANT CHANGE UP
NRBC # BLD: 0 /100 WBCS — SIGNIFICANT CHANGE UP (ref 0–0)
PLATELET # BLD AUTO: 272 K/UL — SIGNIFICANT CHANGE UP (ref 150–400)
POTASSIUM SERPL-MCNC: 3.8 MMOL/L — SIGNIFICANT CHANGE UP (ref 3.5–5.3)
POTASSIUM SERPL-SCNC: 3.8 MMOL/L — SIGNIFICANT CHANGE UP (ref 3.5–5.3)
PROTHROM AB SERPL-ACNC: 11.6 SEC — SIGNIFICANT CHANGE UP (ref 9.5–13)
RBC # BLD: 4.22 M/UL — SIGNIFICANT CHANGE UP (ref 3.8–5.2)
RBC # FLD: 17.2 % — HIGH (ref 10.3–14.5)
SODIUM SERPL-SCNC: 136 MMOL/L — SIGNIFICANT CHANGE UP (ref 135–145)
WBC # BLD: 7.98 K/UL — SIGNIFICANT CHANGE UP (ref 3.8–10.5)
WBC # FLD AUTO: 7.98 K/UL — SIGNIFICANT CHANGE UP (ref 3.8–10.5)

## 2023-09-06 PROCEDURE — 99221 1ST HOSP IP/OBS SF/LOW 40: CPT

## 2023-09-06 RX ORDER — OXYCODONE HYDROCHLORIDE 5 MG/1
5 TABLET ORAL EVERY 4 HOURS
Refills: 0 | Status: DISCONTINUED | OUTPATIENT
Start: 2023-09-06 | End: 2023-09-08

## 2023-09-06 RX ORDER — TRAMADOL HYDROCHLORIDE 50 MG/1
25 TABLET ORAL EVERY 4 HOURS
Refills: 0 | Status: DISCONTINUED | OUTPATIENT
Start: 2023-09-06 | End: 2023-09-06

## 2023-09-06 RX ORDER — INFLUENZA VIRUS VACCINE 15; 15; 15; 15 UG/.5ML; UG/.5ML; UG/.5ML; UG/.5ML
0.5 SUSPENSION INTRAMUSCULAR ONCE
Refills: 0 | Status: DISCONTINUED | OUTPATIENT
Start: 2023-09-06 | End: 2023-09-08

## 2023-09-06 RX ORDER — SODIUM CHLORIDE 9 MG/ML
1000 INJECTION INTRAMUSCULAR; INTRAVENOUS; SUBCUTANEOUS ONCE
Refills: 0 | Status: COMPLETED | OUTPATIENT
Start: 2023-09-06 | End: 2023-09-06

## 2023-09-06 RX ORDER — OXYCODONE HYDROCHLORIDE 5 MG/1
10 TABLET ORAL EVERY 4 HOURS
Refills: 0 | Status: DISCONTINUED | OUTPATIENT
Start: 2023-09-06 | End: 2023-09-08

## 2023-09-06 RX ORDER — AMLODIPINE BESYLATE 2.5 MG/1
5 TABLET ORAL DAILY
Refills: 0 | Status: DISCONTINUED | OUTPATIENT
Start: 2023-09-06 | End: 2023-09-06

## 2023-09-06 RX ORDER — AMLODIPINE BESYLATE 2.5 MG/1
5 TABLET ORAL DAILY
Refills: 0 | Status: DISCONTINUED | OUTPATIENT
Start: 2023-09-06 | End: 2023-09-08

## 2023-09-06 RX ORDER — HYDROMORPHONE HYDROCHLORIDE 2 MG/ML
0.5 INJECTION INTRAMUSCULAR; INTRAVENOUS; SUBCUTANEOUS ONCE
Refills: 0 | Status: DISCONTINUED | OUTPATIENT
Start: 2023-09-06 | End: 2023-09-06

## 2023-09-06 RX ORDER — TRAMADOL HYDROCHLORIDE 50 MG/1
50 TABLET ORAL EVERY 4 HOURS
Refills: 0 | Status: DISCONTINUED | OUTPATIENT
Start: 2023-09-06 | End: 2023-09-06

## 2023-09-06 RX ADMIN — CELECOXIB 200 MILLIGRAM(S): 200 CAPSULE ORAL at 17:43

## 2023-09-06 RX ADMIN — HYDROMORPHONE HYDROCHLORIDE 0.5 MILLIGRAM(S): 2 INJECTION INTRAMUSCULAR; INTRAVENOUS; SUBCUTANEOUS at 01:42

## 2023-09-06 RX ADMIN — TRAMADOL HYDROCHLORIDE 50 MILLIGRAM(S): 50 TABLET ORAL at 07:28

## 2023-09-06 RX ADMIN — OXYCODONE HYDROCHLORIDE 5 MILLIGRAM(S): 5 TABLET ORAL at 16:21

## 2023-09-06 RX ADMIN — Medication 1000 MILLIGRAM(S): at 14:46

## 2023-09-06 RX ADMIN — ONDANSETRON 4 MILLIGRAM(S): 8 TABLET, FILM COATED ORAL at 16:01

## 2023-09-06 RX ADMIN — Medication 1000 MILLIGRAM(S): at 05:33

## 2023-09-06 RX ADMIN — CELECOXIB 200 MILLIGRAM(S): 200 CAPSULE ORAL at 18:43

## 2023-09-06 RX ADMIN — OXYCODONE HYDROCHLORIDE 5 MILLIGRAM(S): 5 TABLET ORAL at 17:21

## 2023-09-06 RX ADMIN — POLYETHYLENE GLYCOL 3350 17 GRAM(S): 17 POWDER, FOR SOLUTION ORAL at 21:30

## 2023-09-06 RX ADMIN — OXYCODONE HYDROCHLORIDE 10 MILLIGRAM(S): 5 TABLET ORAL at 22:30

## 2023-09-06 RX ADMIN — Medication 1000 MILLIGRAM(S): at 13:05

## 2023-09-06 RX ADMIN — Medication 100 MILLIGRAM(S): at 00:21

## 2023-09-06 RX ADMIN — FAMOTIDINE 20 MILLIGRAM(S): 10 INJECTION INTRAVENOUS at 11:31

## 2023-09-06 RX ADMIN — Medication 1000 MILLIGRAM(S): at 22:30

## 2023-09-06 RX ADMIN — TRAMADOL HYDROCHLORIDE 50 MILLIGRAM(S): 50 TABLET ORAL at 08:28

## 2023-09-06 RX ADMIN — CELECOXIB 200 MILLIGRAM(S): 200 CAPSULE ORAL at 05:33

## 2023-09-06 RX ADMIN — SODIUM CHLORIDE 500 MILLILITER(S): 9 INJECTION INTRAMUSCULAR; INTRAVENOUS; SUBCUTANEOUS at 13:13

## 2023-09-06 RX ADMIN — Medication 1000 MILLIGRAM(S): at 06:33

## 2023-09-06 RX ADMIN — HYDROMORPHONE HYDROCHLORIDE 0.5 MILLIGRAM(S): 2 INJECTION INTRAMUSCULAR; INTRAVENOUS; SUBCUTANEOUS at 00:42

## 2023-09-06 RX ADMIN — Medication 1 TABLET(S): at 11:31

## 2023-09-06 RX ADMIN — Medication 81 MILLIGRAM(S): at 17:43

## 2023-09-06 RX ADMIN — SENNA PLUS 2 TABLET(S): 8.6 TABLET ORAL at 21:30

## 2023-09-06 RX ADMIN — CELECOXIB 200 MILLIGRAM(S): 200 CAPSULE ORAL at 06:33

## 2023-09-06 RX ADMIN — TRAMADOL HYDROCHLORIDE 50 MILLIGRAM(S): 50 TABLET ORAL at 12:05

## 2023-09-06 RX ADMIN — TRAMADOL HYDROCHLORIDE 50 MILLIGRAM(S): 50 TABLET ORAL at 14:47

## 2023-09-06 RX ADMIN — Medication 1000 MILLIGRAM(S): at 21:30

## 2023-09-06 RX ADMIN — OXYCODONE HYDROCHLORIDE 10 MILLIGRAM(S): 5 TABLET ORAL at 21:30

## 2023-09-06 RX ADMIN — Medication 100 MILLIGRAM(S): at 07:23

## 2023-09-06 RX ADMIN — Medication 81 MILLIGRAM(S): at 05:33

## 2023-09-06 RX ADMIN — ONDANSETRON 4 MILLIGRAM(S): 8 TABLET, FILM COATED ORAL at 08:39

## 2023-09-06 NOTE — PHYSICAL THERAPY INITIAL EVALUATION ADULT - DIAGNOSIS, PT EVAL
5A: Primary Prevention/Risk Reduction for Loss of Balance and Falling; 4H: Impaired Joint Mobility, Motor Function, Muscle Performance, and Range of Motion Associated with Joint Arthroplasty

## 2023-09-06 NOTE — DISCHARGE NOTE NURSING/CASE MANAGEMENT/SOCIAL WORK - PATIENT PORTAL LINK FT
You can access the FollowMyHealth Patient Portal offered by [facility] by registering at the following website: [email or web address]. By joining [facility]’s FollowMyHealth portal, you will also be able to view your health information using other applications (apps) compatible with our system.

## 2023-09-06 NOTE — CONSULT NOTE ADULT - ASSESSMENT
PCP: Vivien Kowalski  58F Hungarian speaker w HTN, here for elective L TKR w Dr. Shepard 9/5    #Post-op state - pain __. PPx: ASA 81 BID. On bowel regimen and incentive spirometer  #   - Tylenol 1g q8, celebrex 200 q12   - PRN: Tramadol 25/50 q6h for mod/severe pain.     #HTN - Home on amlodipine 5  #Blood loss anemia - 10 - baseline __  #Microcytic anemia -     Plan  PT - INcomplete PCP: Vivien Kowalski  58F Lao speaker w HTN, here for elective L TKR w Dr. Shepard 9/5    #Post-op state - pain moderately controlled. PPx: ASA 81 BID. On bowel regimen and incentive spirometer  #L Knee OA   - Tylenol 1g q8, celebrex 200 q12   - PRN: Tramadol 25/50 q6h for mod/severe pain.     #HTN - Home on amlodipine 5 - benazepril 10  #Blood loss anemia - 10 - baseline 12  #Microcytic anemia -     Plan  Agree w transition to PO Oxycodone 5/10 q4h PRN for mod/severe pain  Encourage PO/Fluid intake  Continue PT    DISPO: HPT

## 2023-09-06 NOTE — PATIENT PROFILE ADULT - FALL HARM RISK - FACTORS
Poor balance/Post Op/Weakness High Dose Vitamin A Pregnancy And Lactation Text: High dose vitamin A therapy is contraindicated during pregnancy and breast feeding.

## 2023-09-06 NOTE — CONSULT NOTE ADULT - SUBJECTIVE AND OBJECTIVE BOX
SURGERY CONSULT  ==============================================================================================================  HPI: 58y Female  HPI:  57yo female with Left knee pain x chronic. Pt denies acute preceding trauma/injury. Pt takes tylenol as needed for her localized knee pain. She denies numbness/tingling/weakness of bilateral lower extremities. She does not ambulate with an assistive device at baseline. Denies DVT hx; denies CP, SOB, N/V, tactile fevers, calf pain.     Presents today for elective Left Total Knee Arthroplasty w/ Dr. Shepard   #[id number] Catherine for history/exam and consent  (01 Sep 2023 10:47)      PAST MEDICAL & SURGICAL HISTORY:  HTN (hypertension)      Dyslipidemia      GERD (gastroesophageal reflux disease)      History of diverticulosis      Inflammatory arthropathy      Anemia      Prediabetes      Primary osteoarthritis of left knee      History of partial hysterectomy      H/O colectomy      History of cholecystectomy      Uterine myoma        Home Meds: Home Medications:  amlodipine - benazepril 5-10mg QD:  (05 Sep 2023 10:44)  Dexilant 60mg QD:  (05 Sep 2023 10:44)  ferrous sulfate 325mg QD:  (05 Sep 2023 10:44)  tylenol 500mg x2 PRN:  (05 Sep 2023 10:44)    Allergies: Allergies    No Known Allergies    Intolerances      Soc:   Advanced Directives: Presumed Full Code     CURRENT MEDICATIONS:   --------------------------------------------------------------------------------------  Neurologic Medications  acetaminophen     Tablet .. 1000 milliGRAM(s) Oral every 8 hours  celecoxib 200 milliGRAM(s) Oral every 12 hours  ondansetron Injectable 4 milliGRAM(s) IV Push every 6 hours PRN Nausea and/or Vomiting  traMADol 50 milliGRAM(s) Oral every 6 hours PRN Moderate Pain (4 - 6)  traMADol 25 milliGRAM(s) Oral every 6 hours PRN Mild Pain (1 - 3)    PCP: Vivien Dex  58F Vietnamese speaker w HTN, here for elective L TKR w Dr. Shepard 9/5    #Post-op state - pain __. PPx: ASA 81 BID. On bowel regimen and incentive spirometer  #   - Tylenol 1g q8, celebrex 200 q12   - PRN: Tramadol 25/50 q6h for mod/severe pain.     #HTN - Home on amlodipine 5  #Blood loss anemia -  #Microcytic anemia -     Respiratory Medications    Cardiovascular Medications  amLODIPine   Tablet 5 milliGRAM(s) Oral daily    Gastrointestinal Medications  famotidine    Tablet 20 milliGRAM(s) Oral daily  lactated ringers. 1000 milliLiter(s) IV Continuous <Continuous>  magnesium hydroxide Suspension 30 milliLiter(s) Oral daily PRN Constipation  multivitamin 1 Tablet(s) Oral daily  polyethylene glycol 3350 17 Gram(s) Oral at bedtime  senna 2 Tablet(s) Oral at bedtime    Genitourinary Medications    Hematologic/Oncologic Medications  aspirin enteric coated 81 milliGRAM(s) Oral two times a day    Antimicrobial/Immunologic Medications    Endocrine/Metabolic Medications    Topical/Other Medications    --------------------------------------------------------------------------------------    VITAL SIGNS, INS/OUTS (last 24 hours):  --------------------------------------------------------------------------------------  ICU Vital Signs Last 24 Hrs  T(C): 36.9 (06 Sep 2023 08:32), Max: 36.9 (06 Sep 2023 08:32)  T(F): 98.5 (06 Sep 2023 08:32), Max: 98.5 (06 Sep 2023 08:32)  HR: 74 (06 Sep 2023 08:32) (62 - 76)  BP: 130/79 (06 Sep 2023 08:32) (124/71 - 168/94)  BP(mean): 102 (05 Sep 2023 20:15) (94 - 126)  ABP: --  ABP(mean): --  RR: 16 (06 Sep 2023 08:32) (13 - 24)  SpO2: 93% (06 Sep 2023 08:32) (93% - 99%)    O2 Parameters below as of 06 Sep 2023 08:32  Patient On (Oxygen Delivery Method): room air          I&O's Summary    05 Sep 2023 07:01  -  06 Sep 2023 07:00  --------------------------------------------------------  IN: 150 mL / OUT: 1800 mL / NET: -1650 mL      --------------------------------------------------------------------------------------    EXAM:    LABS  --------------------------------------------------------------------------------------  Labs:  CAPILLARY BLOOD GLUCOSE                              10.3   7.98  )-----------( 272      ( 06 Sep 2023 06:26 )             33.6         09-06    136  |  102  |  10  ----------------------------<  104<H>  3.8   |  23  |  0.61      Calcium: 9.3 mg/dL (09-06-23 @ 06:26)      LFTs:             x    | 0.8  | x        ------------------[x       ( 06 Sep 2023 06:26 )  x    | x    | x           Lipase:x      Amylase:x             Coags:     11.6   ----< 1.02    ( 06 Sep 2023 06:26 )     x                   Urinalysis Basic - ( 06 Sep 2023 06:26 )    Color: x / Appearance: x / SG: x / pH: x  Gluc: 104 mg/dL / Ketone: x  / Bili: x / Urobili: x   Blood: x / Protein: x / Nitrite: x   Leuk Esterase: x / RBC: x / WBC x   Sq Epi: x / Non Sq Epi: x / Bacteria: x          --------------------------------------------------------------------------------------    OTHER LABS    IMAGING RESULTS  ****************   HPI "59yo female with Left knee pain x chronic. Pt denies acute preceding trauma/injury. Pt takes tylenol as needed for her localized knee pain. She denies numbness/tingling/weakness of bilateral lower extremities. She does not ambulate with an assistive device at baseline. Denies DVT hx; denies CP, SOB, N/V, tactile fevers, calf pain.     Presents today for elective Left Total Knee Arthroplasty w/ Dr. Shepard   #[id number] Catherine for history/exam and consent  (01 Sep 2023 10:47)"      PCP: Vivien Kowalski  58F Croatian speaker w HTN, here for elective L TKR w Dr. Shepard 9/5, for HPT     elected to serve as   Pt reports pain not well controlled during PT. Reports some nausea w vomiting this morning but resolved and was able to eat lunch. +flatus wo BM. Voiding wo dysuria. No Fever, chest pain, dyspnea.    ROS: 12 point ROS reviewed and otherwise negative as per HPI   FH: NO DVT/PE  SH: Non smoker.       PAST MEDICAL & SURGICAL HISTORY:  HTN (hypertension)      Dyslipidemia      GERD (gastroesophageal reflux disease)      History of diverticulosis      Inflammatory arthropathy      Anemia      Prediabetes      Primary osteoarthritis of left knee      History of partial hysterectomy      H/O colectomy      History of cholecystectomy      Uterine myoma        Home Meds: Home Medications:  amlodipine - benazepril 5-10mg QD:  (05 Sep 2023 10:44)  Dexilant 60mg QD:  (05 Sep 2023 10:44)  ferrous sulfate 325mg QD:  (05 Sep 2023 10:44)  tylenol 500mg x2 PRN:  (05 Sep 2023 10:44)    Allergies: Allergies    No Known Allergies    Intolerances      Soc:   Advanced Directives: Presumed Full Code     CURRENT MEDICATIONS:   --------------------------------------------------------------------------------------  Neurologic Medications  acetaminophen     Tablet .. 1000 milliGRAM(s) Oral every 8 hours  celecoxib 200 milliGRAM(s) Oral every 12 hours  ondansetron Injectable 4 milliGRAM(s) IV Push every 6 hours PRN Nausea and/or Vomiting  traMADol 50 milliGRAM(s) Oral every 6 hours PRN Moderate Pain (4 - 6)  traMADol 25 milliGRAM(s) Oral every 6 hours PRN Mild Pain (1 - 3)    Respiratory Medications    Cardiovascular Medications  amLODIPine   Tablet 5 milliGRAM(s) Oral daily    Gastrointestinal Medications  famotidine    Tablet 20 milliGRAM(s) Oral daily  lactated ringers. 1000 milliLiter(s) IV Continuous <Continuous>  magnesium hydroxide Suspension 30 milliLiter(s) Oral daily PRN Constipation  multivitamin 1 Tablet(s) Oral daily  polyethylene glycol 3350 17 Gram(s) Oral at bedtime  senna 2 Tablet(s) Oral at bedtime    Genitourinary Medications    Hematologic/Oncologic Medications  aspirin enteric coated 81 milliGRAM(s) Oral two times a day    Antimicrobial/Immunologic Medications    Endocrine/Metabolic Medications    Topical/Other Medications    --------------------------------------------------------------------------------------    VITAL SIGNS, INS/OUTS (last 24 hours):  --------------------------------------------------------------------------------------  ICU Vital Signs Last 24 Hrs  T(C): 36.9 (06 Sep 2023 08:32), Max: 36.9 (06 Sep 2023 08:32)  T(F): 98.5 (06 Sep 2023 08:32), Max: 98.5 (06 Sep 2023 08:32)  HR: 74 (06 Sep 2023 08:32) (62 - 76)  BP: 130/79 (06 Sep 2023 08:32) (124/71 - 168/94)  BP(mean): 102 (05 Sep 2023 20:15) (94 - 126)  ABP: --  ABP(mean): --  RR: 16 (06 Sep 2023 08:32) (13 - 24)  SpO2: 93% (06 Sep 2023 08:32) (93% - 99%)    O2 Parameters below as of 06 Sep 2023 08:32  Patient On (Oxygen Delivery Method): room air          I&O's Summary    05 Sep 2023 07:01  -  06 Sep 2023 07:00  --------------------------------------------------------  IN: 150 mL / OUT: 1800 mL / NET: -1650 mL      --------------------------------------------------------------------------------------    EXAM:  GEN: female in NAD on RA  HEENT: NC/AT, MMM  CV: RRR, nml S1S2, no murmurs  PULM: nml effort, CTAB  ABD: Soft, non-distended, NABS, non-tender  NEURO  A/O x3, moving all extremities, Sensation intact  L Knee dressing c/d/i. plantarflex/ext 5/5 b/l  PSYCH: Appropriate    LABS  --------------------------------------------------------------------------------------  Labs:  CAPILLARY BLOOD GLUCOSE                              10.3   7.98  )-----------( 272      ( 06 Sep 2023 06:26 )             33.6         09-06    136  |  102  |  10  ----------------------------<  104<H>  3.8   |  23  |  0.61      Calcium: 9.3 mg/dL (09-06-23 @ 06:26)      LFTs:             x    | 0.8  | x        ------------------[x       ( 06 Sep 2023 06:26 )  x    | x    | x           Lipase:x      Amylase:x             Coags:     11.6   ----< 1.02    ( 06 Sep 2023 06:26 )     x                   Urinalysis Basic - ( 06 Sep 2023 06:26 )    Color: x / Appearance: x / SG: x / pH: x  Gluc: 104 mg/dL / Ketone: x  / Bili: x / Urobili: x   Blood: x / Protein: x / Nitrite: x   Leuk Esterase: x / RBC: x / WBC x   Sq Epi: x / Non Sq Epi: x / Bacteria: x          --------------------------------------------------------------------------------------    OTHER LABS    IMAGING RESULTS  ****************

## 2023-09-06 NOTE — PROGRESS NOTE ADULT - SUBJECTIVE AND OBJECTIVE BOX
Ortho Note    Pt comfortable without complaints, pain controlled  Denies CP, SOB, N/V, new numbness/tingling     Vital Signs Last 24 Hrs  T(C): 36.7 (09-06-23 @ 05:08), Max: 36.7 (09-06-23 @ 05:08)  T(F): 98.1 (09-06-23 @ 05:08), Max: 98.1 (09-06-23 @ 05:08)  HR: 68 (09-06-23 @ 05:08) (68 - 68)  BP: 124/71 (09-06-23 @ 05:08) (124/71 - 124/71)  BP(mean): --  RR: 16 (09-06-23 @ 05:08) (16 - 16)  SpO2: 97% (09-06-23 @ 05:08) (97% - 97%)  I&O's Summary    05 Sep 2023 07:01  -  06 Sep 2023 06:51  --------------------------------------------------------  IN: 150 mL / OUT: 1800 mL / NET: -1650 mL      Physical Exam:  General: Pt Alert and oriented, NAD  LLE  DSG C/D/I, ace  Pulses: 2+ dp, pt pulses, wwp, cap refill <3 sec  Sensation: SILT in sural/saph/sp/dp/tibial distributions  Motor: EHL/FHL/TA/GS5/5                            10.3   7.98  )-----------( 272      ( 06 Sep 2023 06:26 )             33.6             A/P: 58yFemale s/p L TKA with Dr. Shepard on 9/5/23  - Stable  - Pain Control  - f/u AM labs  - DVT ppx: SCDs. asa  - Post op abx: periop ancef  - PT, WBS: WBAT  - Dispo: Pending PT      Ortho Pager [phone] Ortho Note      Denies CP, SOB, N/V, new numbness/tingling     Vital Signs Last 24 Hrs  T(C): 36.7 (09-06-23 @ 05:08), Max: 36.7 (09-06-23 @ 05:08)  T(F): 98.1 (09-06-23 @ 05:08), Max: 98.1 (09-06-23 @ 05:08)  HR: 68 (09-06-23 @ 05:08) (68 - 68)  BP: 124/71 (09-06-23 @ 05:08) (124/71 - 124/71)  BP(mean): --  RR: 16 (09-06-23 @ 05:08) (16 - 16)  SpO2: 97% (09-06-23 @ 05:08) (97% - 97%)  I&O's Summary    05 Sep 2023 07:01  -  06 Sep 2023 06:51  --------------------------------------------------------  IN: 150 mL / OUT: 1800 mL / NET: -1650 mL      Physical Exam:  General: Pt Alert and oriented, NAD  LLE  DSG C/D/I, ace  Pulses: 2+ dp, pt pulses, wwp, cap refill <3 sec  Sensation: SILT in sural/saph/sp/dp/tibial distributions  Motor: EHL/FHL/TA/GS5/5                            10.3   7.98  )-----------( 272      ( 06 Sep 2023 06:26 )             33.6             A/P: 58yFemale s/p L TKA with Dr. Shepard on 9/5/23  - Stable  - Pain Control  - f/u AM labs  - DVT ppx: SCDs. asa  - Post op abx: periop ancef  - PT, WBS: WBAT  - Dispo: Pending PT      Ortho Pager [phone]

## 2023-09-06 NOTE — PATIENT PROFILE ADULT - FALL HARM RISK - HARM RISK INTERVENTIONS
Assistance with ambulation/Assistance OOB with selected safe patient handling equipment/Communicate Risk of Fall with Harm to all staff/Discuss with provider need for PT consult/Monitor gait and stability/Provide patient with walking aids - walker, cane, crutches/Reinforce activity limits and safety measures with patient and family/Sit up slowly, dangle for a short time, stand at bedside before walking/Tailored Fall Risk Interventions/Use of alarms - bed, chair and/or voice tab/Visual Cue: Yellow wristband and red socks/Bed in lowest position, wheels locked, appropriate side rails in place/Call bell, personal items and telephone in reach/Instruct patient to call for assistance before getting out of bed or chair/Non-slip footwear when patient is out of bed/[place] to call system/Physically safe environment - no spills, clutter or unnecessary equipment/Purposeful Proactive Rounding/Room/bathroom lighting operational, light cord in reach

## 2023-09-06 NOTE — PHYSICAL THERAPY INITIAL EVALUATION ADULT - GAIT DISTANCE, PT EVAL
4 side steps to L, 4 side steps to R x 1 4 side steps to L, 4 side steps to R x 1 limited by c/o nausea and dizziness

## 2023-09-06 NOTE — PHYSICAL THERAPY INITIAL EVALUATION ADULT - THERAPY FREQUENCY, PT EVAL
Patient educated on frequency of inpatient physical therapy at [facility], patient verbalized understanding./daily

## 2023-09-06 NOTE — PHYSICAL THERAPY INITIAL EVALUATION ADULT - PERTINENT HX OF CURRENT PROBLEM, REHAB EVAL
59yo female with Left knee pain x chronic. Pt denies acute preceding trauma/injury. Pt takes tylenol as needed for her localized knee pain. She denies numbness/tingling/weakness of bilateral lower extremities. She does not ambulate with an assistive device at baseline. Denies DVT hx; denies CP, SOB, N/V, tactile fevers, calf pain. Now s/p Arthroplasty, knee, left, total, robot-assisted 9/5.

## 2023-09-06 NOTE — PHYSICAL THERAPY INITIAL EVALUATION ADULT - ADDITIONAL COMMENTS
Pt reports living in apartment with 3 FOS to enter. Reports being independent with daily activities without use of DME. Reports having cane at home. Reports living with daughter.

## 2023-09-06 NOTE — PHYSICAL THERAPY INITIAL EVALUATION ADULT - GENERAL OBSERVATIONS, REHAB EVAL
PT IE completed. Chart reviewed. Pt received semi-supine, NAD, +LLE ACE bandage, +IV heplock, family at bedside.

## 2023-09-06 NOTE — PHYSICAL THERAPY INITIAL EVALUATION ADULT - GAIT DEVIATIONS NOTED, PT EVAL
antalgic gait, impaired tolerance to WB on LLE; one instance of mild LOB in which pt able to recover without increase in assist/decreased juan/decreased step length/decreased weight-shifting ability

## 2023-09-06 NOTE — PROGRESS NOTE ADULT - SUBJECTIVE AND OBJECTIVE BOX
Ortho Note    Pt seen and examined at the bedside. Pt reporting medial left knee pain. States pain is improved with tramadol. Pt worked with physical therapy this morning, however, became dizzy and nauseous after standing which resolved sitting down. Family at bedside reports she vomited early this morning after eating her breakfast. Urinating without difficulty. Passing flatus.   Denies CP, SOB, denies nausea during interview, new numbness/tingling     Vital Signs Last 24 Hrs  T(C): 36.9 (09-06-23 @ 08:32), Max: 36.9 (09-06-23 @ 08:32)  T(F): 98.5 (09-06-23 @ 08:32), Max: 98.5 (09-06-23 @ 08:32)  HR: 74 (09-06-23 @ 08:32) (74 - 74)  BP: 130/79 (09-06-23 @ 08:32) (130/79 - 130/79)  BP(mean): --  RR: 16 (09-06-23 @ 08:32) (16 - 16)  SpO2: 93% (09-06-23 @ 08:32) (93% - 93%)  I&O's Summary    05 Sep 2023 07:01  -  06 Sep 2023 07:00  --------------------------------------------------------  IN: 150 mL / OUT: 1800 mL / NET: -1650 mL        General: Pt Alert and oriented, NAD  DSG C/D/I- abd pads, ACE left knee  Pulses: 2+ DP bilateral lower extremities  Sensation: SILT distally bilateral lower extremities  Motor:   EHL/FHL/TA/GS: 5/5 bilaterally                          10.3   7.98  )-----------( 272      ( 06 Sep 2023 06:26 )             33.6     09-06    136  |  102  |  10  ----------------------------<  104<H>  3.8   |  23  |  0.61    Ca    9.3      06 Sep 2023 06:26    TPro  x   /  Alb  x   /  TBili  0.8  /  DBili  x   /  AST  x   /  ALT  x   /  AlkPhos  x   09-06      A/P: 58yFemale POD #1 s/p Left TKA with Dr. Shepard   - Stable, orthostatic with physical therapy this am, bp normalized once back in bed, IVF bolus ordered NS 1L over 2 hrs  - Pain Control- tramadol 25mg/50mg po for moderate to severe pain frequency decreased from q6 to q4  - Zofran prn for nausea  - Bowel regimen  - OOB with meals, encourage IS  - DVT ppx: SCDs, Asa 81mg oral twice a day   - PT, WBS: WBAT, pending PT recs    Ortho Pager [phone]

## 2023-09-07 ENCOUNTER — TRANSCRIPTION ENCOUNTER (OUTPATIENT)
Age: 58
End: 2023-09-07

## 2023-09-07 LAB
ANION GAP SERPL CALC-SCNC: 8 MMOL/L — SIGNIFICANT CHANGE UP (ref 5–17)
BUN SERPL-MCNC: 11 MG/DL — SIGNIFICANT CHANGE UP (ref 7–23)
CALCIUM SERPL-MCNC: 9.6 MG/DL — SIGNIFICANT CHANGE UP (ref 8.4–10.5)
CHLORIDE SERPL-SCNC: 105 MMOL/L — SIGNIFICANT CHANGE UP (ref 96–108)
CO2 SERPL-SCNC: 25 MMOL/L — SIGNIFICANT CHANGE UP (ref 22–31)
CREAT SERPL-MCNC: 0.6 MG/DL — SIGNIFICANT CHANGE UP (ref 0.5–1.3)
EGFR: 103 ML/MIN/1.73M2 — SIGNIFICANT CHANGE UP
GLUCOSE SERPL-MCNC: 113 MG/DL — HIGH (ref 70–99)
HCT VFR BLD CALC: 32.8 % — LOW (ref 34.5–45)
HGB BLD-MCNC: 9.8 G/DL — LOW (ref 11.5–15.5)
MCHC RBC-ENTMCNC: 24.3 PG — LOW (ref 27–34)
MCHC RBC-ENTMCNC: 29.9 GM/DL — LOW (ref 32–36)
MCV RBC AUTO: 81.2 FL — SIGNIFICANT CHANGE UP (ref 80–100)
NRBC # BLD: 0 /100 WBCS — SIGNIFICANT CHANGE UP (ref 0–0)
PLATELET # BLD AUTO: 242 K/UL — SIGNIFICANT CHANGE UP (ref 150–400)
POTASSIUM SERPL-MCNC: 4 MMOL/L — SIGNIFICANT CHANGE UP (ref 3.5–5.3)
POTASSIUM SERPL-SCNC: 4 MMOL/L — SIGNIFICANT CHANGE UP (ref 3.5–5.3)
RBC # BLD: 4.04 M/UL — SIGNIFICANT CHANGE UP (ref 3.8–5.2)
RBC # FLD: 17.2 % — HIGH (ref 10.3–14.5)
SODIUM SERPL-SCNC: 138 MMOL/L — SIGNIFICANT CHANGE UP (ref 135–145)
WBC # BLD: 7.77 K/UL — SIGNIFICANT CHANGE UP (ref 3.8–10.5)
WBC # FLD AUTO: 7.77 K/UL — SIGNIFICANT CHANGE UP (ref 3.8–10.5)

## 2023-09-07 PROCEDURE — 99232 SBSQ HOSP IP/OBS MODERATE 35: CPT

## 2023-09-07 RX ORDER — OXYCODONE HYDROCHLORIDE 5 MG/1
1 TABLET ORAL
Qty: 20 | Refills: 0
Start: 2023-09-07 | End: 2023-09-11

## 2023-09-07 RX ORDER — ACETAMINOPHEN 500 MG
2 TABLET ORAL
Qty: 0 | Refills: 0 | DISCHARGE
Start: 2023-09-07

## 2023-09-07 RX ORDER — ASPIRIN/CALCIUM CARB/MAGNESIUM 324 MG
1 TABLET ORAL
Qty: 0 | Refills: 0 | DISCHARGE
Start: 2023-09-07

## 2023-09-07 RX ORDER — CELECOXIB 200 MG/1
1 CAPSULE ORAL
Qty: 0 | Refills: 0 | DISCHARGE
Start: 2023-09-07

## 2023-09-07 RX ADMIN — Medication 1000 MILLIGRAM(S): at 14:06

## 2023-09-07 RX ADMIN — CELECOXIB 200 MILLIGRAM(S): 200 CAPSULE ORAL at 18:00

## 2023-09-07 RX ADMIN — Medication 1000 MILLIGRAM(S): at 21:09

## 2023-09-07 RX ADMIN — FAMOTIDINE 20 MILLIGRAM(S): 10 INJECTION INTRAVENOUS at 11:16

## 2023-09-07 RX ADMIN — OXYCODONE HYDROCHLORIDE 10 MILLIGRAM(S): 5 TABLET ORAL at 11:25

## 2023-09-07 RX ADMIN — Medication 1000 MILLIGRAM(S): at 07:00

## 2023-09-07 RX ADMIN — OXYCODONE HYDROCHLORIDE 10 MILLIGRAM(S): 5 TABLET ORAL at 14:32

## 2023-09-07 RX ADMIN — Medication 81 MILLIGRAM(S): at 06:00

## 2023-09-07 RX ADMIN — CELECOXIB 200 MILLIGRAM(S): 200 CAPSULE ORAL at 06:01

## 2023-09-07 RX ADMIN — Medication 81 MILLIGRAM(S): at 18:00

## 2023-09-07 RX ADMIN — Medication 1000 MILLIGRAM(S): at 13:06

## 2023-09-07 RX ADMIN — Medication 1000 MILLIGRAM(S): at 22:09

## 2023-09-07 RX ADMIN — CELECOXIB 200 MILLIGRAM(S): 200 CAPSULE ORAL at 19:00

## 2023-09-07 RX ADMIN — OXYCODONE HYDROCHLORIDE 10 MILLIGRAM(S): 5 TABLET ORAL at 15:32

## 2023-09-07 RX ADMIN — CELECOXIB 200 MILLIGRAM(S): 200 CAPSULE ORAL at 07:00

## 2023-09-07 RX ADMIN — Medication 1000 MILLIGRAM(S): at 06:00

## 2023-09-07 RX ADMIN — Medication 1 TABLET(S): at 11:16

## 2023-09-07 RX ADMIN — AMLODIPINE BESYLATE 5 MILLIGRAM(S): 2.5 TABLET ORAL at 06:01

## 2023-09-07 RX ADMIN — OXYCODONE HYDROCHLORIDE 10 MILLIGRAM(S): 5 TABLET ORAL at 10:25

## 2023-09-07 NOTE — DISCHARGE NOTE PROVIDER - NSDCCPCAREPLAN_GEN_ALL_CORE_FT
PRINCIPAL DISCHARGE DIAGNOSIS  Diagnosis: Osteoarthritis of left knee  Assessment and Plan of Treatment: left TKR

## 2023-09-07 NOTE — DISCHARGE NOTE PROVIDER - NSDCCPTREATMENT_GEN_ALL_CORE_FT
PRINCIPAL PROCEDURE  Procedure: Arthroplasty, knee, left, total, robot-assisted  Findings and Treatment: osteoarthritis

## 2023-09-07 NOTE — PROGRESS NOTE ADULT - SUBJECTIVE AND OBJECTIVE BOX
Ortho Note    Pt comfortable without complaints, pain better controlled. She states her pain was ok yesterday AM but worsened immeidately after PT session in late morning. Has responded better to pain regiment change  Denies CP, SOB, N/V, new numbness/tingling     Vital Signs Last 24 Hrs  T(C): 37.2 (09-07-23 @ 05:35), Max: 37.2 (09-07-23 @ 05:35)  T(F): 98.9 (09-07-23 @ 05:35), Max: 98.9 (09-07-23 @ 05:35)  HR: 79 (09-07-23 @ 05:35) (79 - 79)  BP: 131/83 (09-07-23 @ 05:35) (131/83 - 131/83)  BP(mean): --  RR: 16 (09-07-23 @ 05:35) (16 - 16)  SpO2: 98% (09-07-23 @ 05:35) (98% - 98%)  I&O's Summary    05 Sep 2023 07:01  -  06 Sep 2023 07:00  --------------------------------------------------------  IN: 150 mL / OUT: 1800 mL / NET: -1650 mL    06 Sep 2023 07:01  -  07 Sep 2023 06:17  --------------------------------------------------------  IN: 0 mL / OUT: 900 mL / NET: -900 mL        Physical Exam:  General: Pt Alert and oriented, NAD  LLE  DSG C/D/I, ace  Pulses: 2+ dp, pt pulses, wwp, cap refill <3 sec  Sensation: SILT in sural/saph/sp/dp/tibial distributions  Motor: EHL/FHL/TA/GS5/5                          9.8    7.77  )-----------( 242      ( 07 Sep 2023 05:35 )             32.8     09-07    138  |  105  |  11  ----------------------------<  113<H>  4.0   |  25  |  0.60    Ca    9.6      07 Sep 2023 05:35    TPro  x   /  Alb  x   /  TBili  0.8  /  DBili  x   /  AST  x   /  ALT  x   /  AlkPhos  x   09-06      A/P: 58yFemale s/p L TKA with Dr. Shepard on 9/5/23  - Stable  - Pain Control  - f/u AM labs  - DVT ppx: SCDs. asa  - Post op abx: periop ancef  - PT, WBS: WBAT  - Dispo: Pending PT final dispo      Ortho Pager [phone]

## 2023-09-07 NOTE — DISCHARGE NOTE PROVIDER - NSDCMRMEDTOKEN_GEN_ALL_CORE_FT
acetaminophen 500 mg oral tablet: 2 tab(s) orally every 8 hours  amlodipine - benazepril 5-10mg QD:   aspirin 81 mg oral delayed release tablet: 1 tab(s) orally 2 times a day  celecoxib 200 mg oral capsule: 1 cap(s) orally every 12 hours  Dexilant 60mg QD:   ferrous sulfate 325mg QD:   oxyCODONE 5 mg oral tablet: 1 tab(s) orally every 6 hours as needed for  severe pain MDD: 4

## 2023-09-07 NOTE — PROGRESS NOTE ADULT - SUBJECTIVE AND OBJECTIVE BOX
Ortho Note    Pt seen and examined. Comfortable without complaints. Pain is better controlled on oxycodone.  Denies dizzines/ nausea.  Denies CP, SOB, N/V, numbness/tingling     Vital Signs Last 24 Hrs  T(C): 36.8 (09-07-23 @ 08:25), Max: 36.8 (09-07-23 @ 08:25)  T(F): 98.2 (09-07-23 @ 08:25), Max: 98.2 (09-07-23 @ 08:25)  HR: 86 (09-07-23 @ 08:25) (86 - 86)  BP: 133/81 (09-07-23 @ 08:25) (133/81 - 133/81)  BP(mean): --  RR: 16 (09-07-23 @ 08:25) (16 - 16)  SpO2: 94% (09-07-23 @ 08:25) (94% - 94%)  AVSS    General: Pt Alert and oriented, NAD  DSG- prineo/ ace C/D/I  Pulses: +2DP, WWP feet  Sensation: SILT BLE  Motor: 5/5 EHL/FHL/TA/GS BLE                          9.8    7.77  )-----------( 242      ( 07 Sep 2023 05:35 )             32.8     09-07    138  |  105  |  11  ----------------------------<  113<H>  4.0   |  25  |  0.60    Ca    9.6      07 Sep 2023 05:35    TPro  x   /  Alb  x   /  TBili  0.8  /  DBili  x   /  AST  x   /  ALT  x   /  AlkPhos  x   09-06      A/P: 58yFemale POD#1 s/p left total knee replacement  - VSS, Labs WNL  - Pain Control  - DVT ppx: ASA  - PT, WBS: WBAT  - OOB for meals, I/S  - continue bowel regimen  - dispo: home with HPT pending PT clearance    Ortho Pager [phone] Ortho Note    Pt seen and examined. Comfortable without complaints. Pain is better controlled on oxycodone.  Denies dizzines/ nausea.  Denies CP, SOB, N/V, numbness/tingling     Vital Signs Last 24 Hrs  T(C): 36.8 (09-07-23 @ 08:25), Max: 36.8 (09-07-23 @ 08:25)  T(F): 98.2 (09-07-23 @ 08:25), Max: 98.2 (09-07-23 @ 08:25)  HR: 86 (09-07-23 @ 08:25) (86 - 86)  BP: 133/81 (09-07-23 @ 08:25) (133/81 - 133/81)  BP(mean): --  RR: 16 (09-07-23 @ 08:25) (16 - 16)  SpO2: 94% (09-07-23 @ 08:25) (94% - 94%)  AVSS    General: Pt Alert and oriented, NAD  DSG- prineo/ ace C/D/I  Pulses: +2DP, WWP feet  Sensation: SILT BLE  Motor: 5/5 EHL/FHL/TA/GS BLE                          9.8    7.77  )-----------( 242      ( 07 Sep 2023 05:35 )             32.8     09-07    138  |  105  |  11  ----------------------------<  113<H>  4.0   |  25  |  0.60    Ca    9.6      07 Sep 2023 05:35    TPro  x   /  Alb  x   /  TBili  0.8  /  DBili  x   /  AST  x   /  ALT  x   /  AlkPhos  x   09-06      A/P: 58yFemale POD#2 s/p left total knee replacement  - VSS, Labs WNL  - Pain Control  - DVT ppx: ASA  - PT, WBS: WBAT  - OOB for meals, I/S  - continue bowel regimen  - dispo: home with HPT pending PT clearance    Ortho Pager [phone]

## 2023-09-07 NOTE — DISCHARGE NOTE PROVIDER - HOSPITAL COURSE
Admitted 9/6/23  Surgery- left total knee replacement  Tea-op Antibiotics  Pain control  DVT prophylaxis- aspirin 81mg bid  OOB/Physical Therapy

## 2023-09-07 NOTE — DISCHARGE NOTE PROVIDER - NSDCDCMDCOMP_GEN_ALL_CORE
Received a call from Dr. Mata in the ER regarding plan for patient who presented from ophthalmology office for asymptomatic hypertensive urgency. Patient given home medication (losartan-hydrochlorothiazide) with some improvement of BP, though still elevated. She was also given amlodipine 5 mg. No sign of end organ damage on work up. Patient will be started on amlodipine 5 mg in addition to current home medication and follow up for nurse visit BP check in 2 days.     She also will be following up with ophthalmology in the next week regarding her retinal vein occlusion and elevated IOP.   This document is complete and the patient is ready for discharge.

## 2023-09-07 NOTE — PROGRESS NOTE ADULT - SUBJECTIVE AND OBJECTIVE BOX
INTERVAL HPI/OVERNIGHT EVENTS: sri o/n    SUBJECTIVE: Patient seen and examined at bedside.    electing to be [place] intepreter  Pt reports pain is improving in her knee - feels oxycodone provides better control. Eating/drinking better. No N/V/abd pain. Voiding. No fever, chest pain, dyspnea. Eager to mobilize today.     OBJECTIVE:    VITAL SIGNS:  ICU Vital Signs Last 24 Hrs  T(C): 36.8 (07 Sep 2023 08:25), Max: 37.2 (06 Sep 2023 20:55)  T(F): 98.2 (07 Sep 2023 08:25), Max: 98.9 (06 Sep 2023 20:55)  HR: 86 (07 Sep 2023 08:25) (79 - 86)  BP: 138/88 (07 Sep 2023 11:02) (106/75 - 149/89)  BP(mean): --  ABP: --  ABP(mean): --  RR: 16 (07 Sep 2023 08:25) (16 - 16)  SpO2: 94% (07 Sep 2023 08:25) (94% - 98%)    O2 Parameters below as of 07 Sep 2023 08:25  Patient On (Oxygen Delivery Method): room air              09-06 @ 07:01  -  09-07 @ 07:00  --------------------------------------------------------  IN: 0 mL / OUT: 1200 mL / NET: -1200 mL      CAPILLARY BLOOD GLUCOSE          PHYSICAL EXAM:  GEN: female in NAD on RA  HEENT: NC/AT, MMM  CV: RRR, nml S1S2, no murmurs  PULM: nml effort, CTAB  ABD: Soft, non-distended, NABS, non-tender  NEURO  A/O x3, moving all extremities, Sensation intact  L Knee dressing c/d/i. plantarflex/ext 5/5 b/l  PSYCH: Appropriate    MEDICATIONS:  MEDICATIONS  (STANDING):  acetaminophen     Tablet .. 1000 milliGRAM(s) Oral every 8 hours  amLODIPine   Tablet 5 milliGRAM(s) Oral daily  aspirin enteric coated 81 milliGRAM(s) Oral two times a day  celecoxib 200 milliGRAM(s) Oral every 12 hours  famotidine    Tablet 20 milliGRAM(s) Oral daily  influenza   Vaccine 0.5 milliLiter(s) IntraMuscular once  lactated ringers. 1000 milliLiter(s) (75 mL/Hr) IV Continuous <Continuous>  multivitamin 1 Tablet(s) Oral daily  polyethylene glycol 3350 17 Gram(s) Oral at bedtime  senna 2 Tablet(s) Oral at bedtime    MEDICATIONS  (PRN):  magnesium hydroxide Suspension 30 milliLiter(s) Oral daily PRN Constipation  ondansetron Injectable 4 milliGRAM(s) IV Push every 6 hours PRN Nausea and/or Vomiting  oxyCODONE    IR 5 milliGRAM(s) Oral every 4 hours PRN Moderate Pain (4 - 6)  oxyCODONE    IR 10 milliGRAM(s) Oral every 4 hours PRN Severe Pain (7 - 10)      ALLERGIES:  Allergies    No Known Allergies    Intolerances        LABS:                        9.8    7.77  )-----------( 242      ( 07 Sep 2023 05:35 )             32.8     09-07    138  |  105  |  11  ----------------------------<  113<H>  4.0   |  25  |  0.60    Ca    9.6      07 Sep 2023 05:35    TPro  x   /  Alb  x   /  TBili  0.8  /  DBili  x   /  AST  x   /  ALT  x   /  AlkPhos  x   09-06    PT/INR - ( 06 Sep 2023 06:26 )   PT: 11.6 sec;   INR: 1.02            Urinalysis Basic - ( 07 Sep 2023 05:35 )    Color: x / Appearance: x / SG: x / pH: x  Gluc: 113 mg/dL / Ketone: x  / Bili: x / Urobili: x   Blood: x / Protein: x / Nitrite: x   Leuk Esterase: x / RBC: x / WBC x   Sq Epi: x / Non Sq Epi: x / Bacteria: x        RADIOLOGY & ADDITIONAL TESTS: Reviewed.

## 2023-09-07 NOTE — DISCHARGE NOTE PROVIDER - CARE PROVIDER_API CALL
Yariel Shepard  Orthopaedic Surgery  [address]  [place] [address]  Phone: [phone]  Fax: [phone]  Follow Up Time: 2 weeks

## 2023-09-07 NOTE — DISCHARGE NOTE PROVIDER - NSDCFUADDINST_GEN_ALL_CORE_FT
[facility] is agreeable to accept pt when medically ready. Notified pt's spouse. Pt must be sitter free 24/hrs prior to d/c. Rapid covid and pre-cert required on day of d/c. D/C INSTRUCTIONS ARE ON FRONT OF PACKET LOCATED WITH THE SOFT CHART.   TE Please follow Dr. Shepard’s instruction sheets. You may take oxycodone as needed for severe pain, or tramadol for more moderate pain. Do not take at the same time as this can cause oversedation.         ACTIVITY:     - Weight bear as tolerated with assistive device. No strenuous activity, heavy lifting, driving or returning to work until cleared by MD.     - Apply a cold compress to the surgical site several times daily to reduce pain and swelling. For icing, twenty-minute sessions followed by an hour off is recommended. You should ice as frequently as possible. Ice should NEVER be placed directly on the skin. Wearing compression stockings during the first week after surgery can help reduce swelling in your knee, calf and foot, but is not required.      (KNEE REPLACEMENTS)     DO place a pillow under your heel when elevating the leg, to encourage full extension of the knee. Do NOT place a pillow behind your knee for comfort, as this can lead to permanent difficulty straightening your leg. It is normal to develop some swelling in the leg, ankle, and foot because of gravity.      MEDICATION/ANTICOAGULATION:     -You have been prescribed aspirin, as a preventative to help prevent postoperative blood clots. Please take this medication as prescribed.      - You have been prescribed medications for pain:       - Tylenol for mild to moderate pain. Do not exceed 3,000mg daily.       - For more severe pain, take Tylenol with the addition of narcotic pain medication. Take this medication as prescribed. This medication may cause drowsiness or dizziness. Do not operate machinery. This medication may cause constipation.     - For any additional medications, follow instructions on the bottle.      -Try to have regular bowel movements. Take stool softener or laxative if necessary. You may wish to take Miralax daily until you have regular bowel movements.          - If you experience any negative side effects of your medications, please call your surgeon's office to discuss.           FOLLOW-UP:     - Call to schedule an appt with Dr. Shepard for follow up.     - Please follow-up with your primary care physician or any other specialist you see postoperatively, if needed.      - Contact your doctor or go to the emergency room if you experience: fever greater than 101.5, chills, chest pain, difficulty breathing, redness or excessive drainage around the incision, other concerns.

## 2023-09-08 VITALS
HEART RATE: 98 BPM | TEMPERATURE: 99 F | OXYGEN SATURATION: 96 % | DIASTOLIC BLOOD PRESSURE: 84 MMHG | SYSTOLIC BLOOD PRESSURE: 138 MMHG | RESPIRATION RATE: 17 BRPM

## 2023-09-08 LAB
ANION GAP SERPL CALC-SCNC: 9 MMOL/L — SIGNIFICANT CHANGE UP (ref 5–17)
BUN SERPL-MCNC: 13 MG/DL — SIGNIFICANT CHANGE UP (ref 7–23)
CALCIUM SERPL-MCNC: 10 MG/DL — SIGNIFICANT CHANGE UP (ref 8.4–10.5)
CHLORIDE SERPL-SCNC: 102 MMOL/L — SIGNIFICANT CHANGE UP (ref 96–108)
CO2 SERPL-SCNC: 25 MMOL/L — SIGNIFICANT CHANGE UP (ref 22–31)
CREAT SERPL-MCNC: 0.63 MG/DL — SIGNIFICANT CHANGE UP (ref 0.5–1.3)
EGFR: 101 ML/MIN/1.73M2 — SIGNIFICANT CHANGE UP
GLUCOSE SERPL-MCNC: 120 MG/DL — HIGH (ref 70–99)
HCT VFR BLD CALC: 33.1 % — LOW (ref 34.5–45)
HGB BLD-MCNC: 10.1 G/DL — LOW (ref 11.5–15.5)
MCHC RBC-ENTMCNC: 24.4 PG — LOW (ref 27–34)
MCHC RBC-ENTMCNC: 30.5 GM/DL — LOW (ref 32–36)
MCV RBC AUTO: 80 FL — SIGNIFICANT CHANGE UP (ref 80–100)
NRBC # BLD: 0 /100 WBCS — SIGNIFICANT CHANGE UP (ref 0–0)
PLATELET # BLD AUTO: 248 K/UL — SIGNIFICANT CHANGE UP (ref 150–400)
POTASSIUM SERPL-MCNC: 3.7 MMOL/L — SIGNIFICANT CHANGE UP (ref 3.5–5.3)
POTASSIUM SERPL-SCNC: 3.7 MMOL/L — SIGNIFICANT CHANGE UP (ref 3.5–5.3)
RBC # BLD: 4.14 M/UL — SIGNIFICANT CHANGE UP (ref 3.8–5.2)
RBC # FLD: 17.2 % — HIGH (ref 10.3–14.5)
SODIUM SERPL-SCNC: 136 MMOL/L — SIGNIFICANT CHANGE UP (ref 135–145)
WBC # BLD: 6.72 K/UL — SIGNIFICANT CHANGE UP (ref 3.8–10.5)
WBC # FLD AUTO: 6.72 K/UL — SIGNIFICANT CHANGE UP (ref 3.8–10.5)

## 2023-09-08 PROCEDURE — C1713: CPT

## 2023-09-08 PROCEDURE — 86803 HEPATITIS C AB TEST: CPT

## 2023-09-08 PROCEDURE — S2900: CPT

## 2023-09-08 PROCEDURE — 85027 COMPLETE CBC AUTOMATED: CPT

## 2023-09-08 PROCEDURE — 36415 COLL VENOUS BLD VENIPUNCTURE: CPT

## 2023-09-08 PROCEDURE — 99231 SBSQ HOSP IP/OBS SF/LOW 25: CPT

## 2023-09-08 PROCEDURE — C1776: CPT

## 2023-09-08 PROCEDURE — 80048 BASIC METABOLIC PNL TOTAL CA: CPT

## 2023-09-08 PROCEDURE — 97116 GAIT TRAINING THERAPY: CPT

## 2023-09-08 PROCEDURE — 73560 X-RAY EXAM OF KNEE 1 OR 2: CPT

## 2023-09-08 RX ADMIN — FAMOTIDINE 20 MILLIGRAM(S): 10 INJECTION INTRAVENOUS at 11:55

## 2023-09-08 RX ADMIN — OXYCODONE HYDROCHLORIDE 10 MILLIGRAM(S): 5 TABLET ORAL at 10:24

## 2023-09-08 RX ADMIN — AMLODIPINE BESYLATE 5 MILLIGRAM(S): 2.5 TABLET ORAL at 05:00

## 2023-09-08 RX ADMIN — Medication 81 MILLIGRAM(S): at 05:00

## 2023-09-08 RX ADMIN — Medication 1000 MILLIGRAM(S): at 06:00

## 2023-09-08 RX ADMIN — OXYCODONE HYDROCHLORIDE 10 MILLIGRAM(S): 5 TABLET ORAL at 15:25

## 2023-09-08 RX ADMIN — Medication 1 TABLET(S): at 14:25

## 2023-09-08 RX ADMIN — OXYCODONE HYDROCHLORIDE 10 MILLIGRAM(S): 5 TABLET ORAL at 14:25

## 2023-09-08 RX ADMIN — OXYCODONE HYDROCHLORIDE 10 MILLIGRAM(S): 5 TABLET ORAL at 11:24

## 2023-09-08 RX ADMIN — CELECOXIB 200 MILLIGRAM(S): 200 CAPSULE ORAL at 06:00

## 2023-09-08 RX ADMIN — CELECOXIB 200 MILLIGRAM(S): 200 CAPSULE ORAL at 05:00

## 2023-09-08 RX ADMIN — Medication 1000 MILLIGRAM(S): at 05:00

## 2023-09-08 RX ADMIN — Medication 1000 MILLIGRAM(S): at 14:25

## 2023-09-08 NOTE — PROGRESS NOTE ADULT - SUBJECTIVE AND OBJECTIVE BOX
Ortho Note    Pt comfortable without complaints, pain controlled  Denies CP, SOB, N/V, new numbness/tingling     Vital Signs Last 24 Hrs  T(C): 36.8 (09-08-23 @ 05:11), Max: 36.8 (09-08-23 @ 05:11)  T(F): 98.2 (09-08-23 @ 05:11), Max: 98.2 (09-08-23 @ 05:11)  HR: 69 (09-08-23 @ 05:11) (69 - 69)  BP: 133/80 (09-08-23 @ 05:11) (133/80 - 133/80)  BP(mean): --  RR: 17 (09-08-23 @ 05:11) (17 - 17)  SpO2: 94% (09-08-23 @ 05:11) (94% - 94%)  I&O's Summary    06 Sep 2023 07:01  -  07 Sep 2023 07:00  --------------------------------------------------------  IN: 0 mL / OUT: 1200 mL / NET: -1200 mL    07 Sep 2023 07:01  -  08 Sep 2023 06:19  --------------------------------------------------------  IN: 420 mL / OUT: 660 mL / NET: -240 mL        Physical Exam:  General: Pt Alert and oriented, NAD  LLE  DSG C/D/I, ace prineo  Pulses: 2+ dp, pt pulses, wwp, cap refill <3 sec  Sensation: SILT in sural/saph/sp/dp/tibial distributions  Motor: EHL/FHL/TA/GS5/5                          9.8    7.77  )-----------( 242      ( 07 Sep 2023 05:35 )             32.8     09-07    138  |  105  |  11  ----------------------------<  113<H>  4.0   |  25  |  0.60    Ca    9.6      07 Sep 2023 05:35    TPro  x   /  Alb  x   /  TBili  0.8  /  DBili  x   /  AST  x   /  ALT  x   /  AlkPhos  x   09-06      A/P: 58yFemale s/p L TKA with Dr. Shepard on 9/5/23  - Stable  - Pain Control  - f/u AM labs  - DVT ppx: SCDs. asa  - Post op abx: periop ancef  - PT, WBS: WBAT  - Dispo: Pending PT final dispo, current rec home pt with [facility]     [place] Pager [phone]

## 2023-09-08 NOTE — PROGRESS NOTE ADULT - ASSESSMENT
PCP: Vivien Kowalski  58F Urdu speaker w HTN, here for elective L TKR w Dr. Shepard 9/5, for HPT    #Post-op state - pain moderately controlled. PPx: ASA 81 BID. On bowel regimen and incentive spirometer  #L Knee OA   - Tylenol 1g q8, celebrex 200 q12   - PRN: Oxycodone 5/10 q4h PRN for mod/severe pain    #HTN - Home on amlodipine 5 - benazepril 10  #Blood loss anemia - 9.8 today from 10 - baseline 12. Appears asymptomatic  #Microcytic anemia -     Plan  Doing well. For home w HPT once clearing PT.    DISPO: HPT
PCP: Vivien Kowalski  58F Occitan speaker w HTN, here for elective L TKR w Dr. Shepard 9/5, for HPT    #Post-op state - pain moderately controlled. PPx: ASA 81 BID. On bowel regimen and incentive spirometer  #L Knee OA   - Tylenol 1g q8, celebrex 200 q12   - PRN: Oxycodone 5/10 q4h PRN for mod/severe pain    #HTN - Home on amlodipine 5 - benazepril 10  #Blood loss anemia - 10.1 from 9.8 today from 10 - baseline 12. Appears asymptomatic  #Microcytic anemia -     Plan  Doing well. For home w HPT once clearing PT.    DISPO: HPT
regular

## 2023-09-08 NOTE — PROGRESS NOTE ADULT - SUBJECTIVE AND OBJECTIVE BOX
INTERVAL HPI/OVERNIGHT EVENTS: sri o/n    SUBJECTIVE: Patient seen and examined at bedside.   Pt feels well overall - eager to return home. Pain improving. Walking wo LH/dizziness, chest pain, dyspnea. Eating wo N/V/abd pain. Voiding. No fevers.    OBJECTIVE:    VITAL SIGNS:  ICU Vital Signs Last 24 Hrs  T(C): 37.1 (08 Sep 2023 09:12), Max: 37.1 (07 Sep 2023 20:46)  T(F): 98.7 (08 Sep 2023 09:12), Max: 98.8 (07 Sep 2023 20:46)  HR: 98 (08 Sep 2023 09:12) (69 - 98)  BP: 138/84 (08 Sep 2023 09:12) (133/80 - 149/81)  BP(mean): --  ABP: --  ABP(mean): --  RR: 17 (08 Sep 2023 09:12) (16 - 18)  SpO2: 96% (08 Sep 2023 09:12) (94% - 97%)    O2 Parameters below as of 08 Sep 2023 09:12  Patient On (Oxygen Delivery Method): room air              09-07 @ 07:01  -  09-08 @ 07:00  --------------------------------------------------------  IN: 420 mL / OUT: 660 mL / NET: -240 mL      CAPILLARY BLOOD GLUCOSE          PHYSICAL EXAM:  GEN: female in NAD on RA  HEENT: NC/AT, MMM  CV: RRR, nml S1S2, no murmurs  PULM: nml effort, CTAB  ABD: Soft, non-distended, NABS, non-tender  NEURO  A/O x3, moving all extremities, Sensation intact  L Knee dressing c/d/i. plantarflex/ext 5/5 b/l  PSYCH: Appropriate    MEDICATIONS:  MEDICATIONS  (STANDING):  acetaminophen     Tablet .. 1000 milliGRAM(s) Oral every 8 hours  amLODIPine   Tablet 5 milliGRAM(s) Oral daily  aspirin enteric coated 81 milliGRAM(s) Oral two times a day  celecoxib 200 milliGRAM(s) Oral every 12 hours  famotidine    Tablet 20 milliGRAM(s) Oral daily  influenza   Vaccine 0.5 milliLiter(s) IntraMuscular once  lactated ringers. 1000 milliLiter(s) (75 mL/Hr) IV Continuous <Continuous>  multivitamin 1 Tablet(s) Oral daily  polyethylene glycol 3350 17 Gram(s) Oral at bedtime  senna 2 Tablet(s) Oral at bedtime    MEDICATIONS  (PRN):  magnesium hydroxide Suspension 30 milliLiter(s) Oral daily PRN Constipation  ondansetron Injectable 4 milliGRAM(s) IV Push every 6 hours PRN Nausea and/or Vomiting  oxyCODONE    IR 5 milliGRAM(s) Oral every 4 hours PRN Moderate Pain (4 - 6)  oxyCODONE    IR 10 milliGRAM(s) Oral every 4 hours PRN Severe Pain (7 - 10)      ALLERGIES:  Allergies    No Known Allergies    Intolerances        LABS:                        10.1   6.72  )-----------( 248      ( 08 Sep 2023 06:41 )             33.1     09-08    136  |  102  |  13  ----------------------------<  120<H>  3.7   |  25  |  0.63    Ca    10.0      08 Sep 2023 06:41        Urinalysis Basic - ( 08 Sep 2023 06:41 )    Color: x / Appearance: x / SG: x / pH: x  Gluc: 120 mg/dL / Ketone: x  / Bili: x / Urobili: x   Blood: x / Protein: x / Nitrite: x   Leuk Esterase: x / RBC: x / WBC x   Sq Epi: x / Non Sq Epi: x / Bacteria: x        RADIOLOGY & ADDITIONAL TESTS: Reviewed.

## 2023-09-14 DIAGNOSIS — Z90.49 ACQUIRED ABSENCE OF OTHER SPECIFIED PARTS OF DIGESTIVE TRACT: ICD-10-CM

## 2023-09-14 DIAGNOSIS — K21.9 GASTRO-ESOPHAGEAL REFLUX DISEASE WITHOUT ESOPHAGITIS: ICD-10-CM

## 2023-09-14 DIAGNOSIS — M17.12 UNILATERAL PRIMARY OSTEOARTHRITIS, LEFT KNEE: ICD-10-CM

## 2023-09-14 DIAGNOSIS — I10 ESSENTIAL (PRIMARY) HYPERTENSION: ICD-10-CM

## 2023-09-14 DIAGNOSIS — E78.5 HYPERLIPIDEMIA, UNSPECIFIED: ICD-10-CM

## 2023-09-14 DIAGNOSIS — Z90.710 ACQUIRED ABSENCE OF BOTH CERVIX AND UTERUS: ICD-10-CM

## 2023-09-14 DIAGNOSIS — R73.03 PREDIABETES: ICD-10-CM

## 2023-09-14 DIAGNOSIS — D50.0 IRON DEFICIENCY ANEMIA SECONDARY TO BLOOD LOSS (CHRONIC): ICD-10-CM

## 2023-09-19 ENCOUNTER — NON-APPOINTMENT (OUTPATIENT)
Age: 58
End: 2023-09-19

## 2023-09-27 ENCOUNTER — APPOINTMENT (OUTPATIENT)
Dept: ORTHOPEDIC SURGERY | Facility: CLINIC | Age: 58
End: 2023-09-27
Payer: MEDICARE

## 2023-09-27 VITALS
HEART RATE: 86 BPM | HEIGHT: 67 IN | OXYGEN SATURATION: 99 % | SYSTOLIC BLOOD PRESSURE: 155 MMHG | BODY MASS INDEX: 25.43 KG/M2 | DIASTOLIC BLOOD PRESSURE: 91 MMHG | WEIGHT: 162 LBS

## 2023-09-27 PROBLEM — Z87.19 PERSONAL HISTORY OF OTHER DISEASES OF THE DIGESTIVE SYSTEM: Chronic | Status: ACTIVE | Noted: 2023-09-01

## 2023-09-27 PROBLEM — K21.9 GASTRO-ESOPHAGEAL REFLUX DISEASE WITHOUT ESOPHAGITIS: Chronic | Status: ACTIVE | Noted: 2023-09-01

## 2023-09-27 PROBLEM — E78.5 HYPERLIPIDEMIA, UNSPECIFIED: Chronic | Status: ACTIVE | Noted: 2023-09-01

## 2023-09-27 PROBLEM — M19.90 UNSPECIFIED OSTEOARTHRITIS, UNSPECIFIED SITE: Chronic | Status: ACTIVE | Noted: 2023-09-01

## 2023-09-27 PROBLEM — R73.03 PREDIABETES: Chronic | Status: ACTIVE | Noted: 2023-09-01

## 2023-09-27 PROBLEM — D64.9 ANEMIA, UNSPECIFIED: Chronic | Status: ACTIVE | Noted: 2023-09-01

## 2023-09-27 PROBLEM — I10 ESSENTIAL (PRIMARY) HYPERTENSION: Chronic | Status: ACTIVE | Noted: 2023-09-01

## 2023-09-27 PROBLEM — M17.12 UNILATERAL PRIMARY OSTEOARTHRITIS, LEFT KNEE: Chronic | Status: ACTIVE | Noted: 2023-09-05

## 2023-09-27 PROCEDURE — 73562 X-RAY EXAM OF KNEE 3: CPT | Mod: LT

## 2023-09-27 PROCEDURE — 99024 POSTOP FOLLOW-UP VISIT: CPT

## 2023-09-27 RX ORDER — DICYCLOMINE HYDROCHLORIDE 20 MG/1
20 TABLET ORAL
Refills: 0 | Status: ACTIVE | COMMUNITY

## 2023-09-27 RX ORDER — MONTELUKAST 10 MG/1
10 TABLET, FILM COATED ORAL
Refills: 0 | Status: ACTIVE | COMMUNITY

## 2023-09-27 RX ORDER — TRAMADOL HYDROCHLORIDE 50 MG/1
50 TABLET, COATED ORAL
Qty: 20 | Refills: 0 | Status: DISCONTINUED | COMMUNITY
Start: 2023-08-25 | End: 2023-09-27

## 2023-10-25 ENCOUNTER — APPOINTMENT (OUTPATIENT)
Dept: ORTHOPEDIC SURGERY | Facility: CLINIC | Age: 58
End: 2023-10-25
Payer: MEDICARE

## 2023-10-25 VITALS
BODY MASS INDEX: 25.43 KG/M2 | SYSTOLIC BLOOD PRESSURE: 135 MMHG | HEIGHT: 67 IN | DIASTOLIC BLOOD PRESSURE: 88 MMHG | HEART RATE: 83 BPM | OXYGEN SATURATION: 100 % | WEIGHT: 162 LBS

## 2023-10-25 PROCEDURE — 99024 POSTOP FOLLOW-UP VISIT: CPT

## 2023-11-15 ENCOUNTER — APPOINTMENT (OUTPATIENT)
Dept: ORTHOPEDIC SURGERY | Facility: CLINIC | Age: 58
End: 2023-11-15
Payer: MEDICARE

## 2023-11-15 VITALS
WEIGHT: 162 LBS | SYSTOLIC BLOOD PRESSURE: 149 MMHG | BODY MASS INDEX: 25.43 KG/M2 | HEIGHT: 67 IN | HEART RATE: 105 BPM | DIASTOLIC BLOOD PRESSURE: 88 MMHG | OXYGEN SATURATION: 96 %

## 2023-11-15 PROCEDURE — 99024 POSTOP FOLLOW-UP VISIT: CPT

## 2023-11-17 LAB
ALBUMIN SERPL ELPH-MCNC: 4.7 G/DL
ALP BLD-CCNC: 89 U/L
ALT SERPL-CCNC: 21 U/L
ANION GAP SERPL CALC-SCNC: 12 MMOL/L
APPEARANCE: CLEAR
AST SERPL-CCNC: 22 U/L
BASOPHILS # BLD AUTO: 0.04 K/UL
BASOPHILS NFR BLD AUTO: 0.7 %
BILIRUB SERPL-MCNC: 0.4 MG/DL
BILIRUBIN URINE: ABNORMAL
BLOOD URINE: NEGATIVE
BUN SERPL-MCNC: 18 MG/DL
CALCIUM SERPL-MCNC: 10.2 MG/DL
CHLORIDE SERPL-SCNC: 104 MMOL/L
CO2 SERPL-SCNC: 24 MMOL/L
COLOR: NORMAL
CREAT SERPL-MCNC: 0.74 MG/DL
CRP SERPL-MCNC: <3 MG/L
EGFR: 93 ML/MIN/1.73M2
EOSINOPHIL # BLD AUTO: 0.05 K/UL
EOSINOPHIL NFR BLD AUTO: 0.9 %
ERYTHROCYTE [SEDIMENTATION RATE] IN BLOOD BY WESTERGREN METHOD: 14 MM/HR
GLUCOSE QUALITATIVE U: NEGATIVE MG/DL
GLUCOSE SERPL-MCNC: 92 MG/DL
HCT VFR BLD CALC: 32.7 %
HGB BLD-MCNC: 9.4 G/DL
IMM GRANULOCYTES NFR BLD AUTO: 0.2 %
KETONES URINE: NEGATIVE MG/DL
LEUKOCYTE ESTERASE URINE: NEGATIVE
LYMPHOCYTES # BLD AUTO: 3.03 K/UL
LYMPHOCYTES NFR BLD AUTO: 54.9 %
MAN DIFF?: NORMAL
MCHC RBC-ENTMCNC: 23.5 PG
MCHC RBC-ENTMCNC: 28.7 GM/DL
MCV RBC AUTO: 81.8 FL
MONOCYTES # BLD AUTO: 0.49 K/UL
MONOCYTES NFR BLD AUTO: 8.9 %
NEUTROPHILS # BLD AUTO: 1.9 K/UL
NEUTROPHILS NFR BLD AUTO: 34.4 %
NITRITE URINE: NEGATIVE
PH URINE: 5
PLATELET # BLD AUTO: 389 K/UL
POTASSIUM SERPL-SCNC: 4.5 MMOL/L
PROT SERPL-MCNC: 7.7 G/DL
PROTEIN URINE: NEGATIVE MG/DL
RBC # BLD: 4 M/UL
RBC # FLD: 17.1 %
SODIUM SERPL-SCNC: 141 MMOL/L
SPECIFIC GRAVITY URINE: 1.03
UROBILINOGEN URINE: 0.2 MG/DL
WBC # FLD AUTO: 5.52 K/UL

## 2023-11-27 ENCOUNTER — TRANSCRIPTION ENCOUNTER (OUTPATIENT)
Age: 58
End: 2023-11-27

## 2023-11-27 VITALS
OXYGEN SATURATION: 99 % | DIASTOLIC BLOOD PRESSURE: 80 MMHG | SYSTOLIC BLOOD PRESSURE: 135 MMHG | WEIGHT: 145.95 LBS | TEMPERATURE: 98 F | RESPIRATION RATE: 16 BRPM | HEIGHT: 67 IN | HEART RATE: 90 BPM

## 2023-11-27 NOTE — ASU PATIENT PROFILE, ADULT - NSICDXPASTSURGICALHX_GEN_ALL_CORE_FT
PAST SURGICAL HISTORY:  H/O colectomy     H/O total knee replacement, left 2023    History of cholecystectomy     History of partial hysterectomy     Uterine myoma

## 2023-11-28 ENCOUNTER — APPOINTMENT (OUTPATIENT)
Dept: ORTHOPEDIC SURGERY | Facility: HOSPITAL | Age: 58
End: 2023-11-28

## 2023-11-28 ENCOUNTER — RESULT REVIEW (OUTPATIENT)
Age: 58
End: 2023-11-28

## 2023-11-28 ENCOUNTER — OUTPATIENT (OUTPATIENT)
Dept: INPATIENT UNIT | Facility: HOSPITAL | Age: 58
LOS: 1 days | Discharge: HOME | End: 2023-11-28
Payer: MEDICARE

## 2023-11-28 ENCOUNTER — TRANSCRIPTION ENCOUNTER (OUTPATIENT)
Age: 58
End: 2023-11-28

## 2023-11-28 VITALS
SYSTOLIC BLOOD PRESSURE: 148 MMHG | DIASTOLIC BLOOD PRESSURE: 69 MMHG | OXYGEN SATURATION: 97 % | HEART RATE: 76 BPM | RESPIRATION RATE: 18 BRPM

## 2023-11-28 DIAGNOSIS — Z90.711 ACQUIRED ABSENCE OF UTERUS WITH REMAINING CERVICAL STUMP: Chronic | ICD-10-CM

## 2023-11-28 DIAGNOSIS — Z90.49 ACQUIRED ABSENCE OF OTHER SPECIFIED PARTS OF DIGESTIVE TRACT: Chronic | ICD-10-CM

## 2023-11-28 DIAGNOSIS — D25.9 LEIOMYOMA OF UTERUS, UNSPECIFIED: Chronic | ICD-10-CM

## 2023-11-28 DIAGNOSIS — Z96.652 PRESENCE OF LEFT ARTIFICIAL KNEE JOINT: Chronic | ICD-10-CM

## 2023-11-28 PROCEDURE — 73560 X-RAY EXAM OF KNEE 1 OR 2: CPT

## 2023-11-28 PROCEDURE — 27570 FIXATION OF KNEE JOINT: CPT | Mod: 78,LT

## 2023-11-28 PROCEDURE — 27570 FIXATION OF KNEE JOINT: CPT | Mod: LT

## 2023-11-28 PROCEDURE — 73560 X-RAY EXAM OF KNEE 1 OR 2: CPT | Mod: 26,LT

## 2023-11-28 RX ORDER — OXYCODONE HYDROCHLORIDE 5 MG/1
5 TABLET ORAL
Refills: 0 | Status: DISCONTINUED | OUTPATIENT
Start: 2023-11-28 | End: 2023-11-28

## 2023-11-28 RX ORDER — CELECOXIB 200 MG/1
200 CAPSULE ORAL EVERY 12 HOURS
Refills: 0 | Status: DISCONTINUED | OUTPATIENT
Start: 2023-11-28 | End: 2023-11-28

## 2023-11-28 RX ORDER — ONDANSETRON 8 MG/1
4 TABLET, FILM COATED ORAL EVERY 6 HOURS
Refills: 0 | Status: DISCONTINUED | OUTPATIENT
Start: 2023-11-28 | End: 2023-11-28

## 2023-11-28 RX ORDER — CELECOXIB 200 MG/1
1 CAPSULE ORAL
Qty: 0 | Refills: 0 | DISCHARGE
Start: 2023-11-28

## 2023-11-28 RX ORDER — OXYCODONE HYDROCHLORIDE 5 MG/1
10 TABLET ORAL
Refills: 0 | Status: DISCONTINUED | OUTPATIENT
Start: 2023-11-28 | End: 2023-11-28

## 2023-11-28 RX ORDER — SODIUM CHLORIDE 9 MG/ML
1000 INJECTION, SOLUTION INTRAVENOUS
Refills: 0 | Status: DISCONTINUED | OUTPATIENT
Start: 2023-11-28 | End: 2023-11-28

## 2023-11-28 RX ORDER — ACETAMINOPHEN 500 MG
1000 TABLET ORAL EVERY 8 HOURS
Refills: 0 | Status: DISCONTINUED | OUTPATIENT
Start: 2023-11-28 | End: 2023-11-28

## 2023-11-28 RX ADMIN — OXYCODONE HYDROCHLORIDE 10 MILLIGRAM(S): 5 TABLET ORAL at 09:19

## 2023-11-28 RX ADMIN — OXYCODONE HYDROCHLORIDE 10 MILLIGRAM(S): 5 TABLET ORAL at 09:35

## 2023-11-28 RX ADMIN — Medication 1000 MILLIGRAM(S): at 09:24

## 2023-11-28 NOTE — ASU DISCHARGE PLAN (ADULT/PEDIATRIC) - CALL YOUR DOCTOR IF YOU HAVE ANY OF THE FOLLOWING:
Swelling that gets worse/Pain not relieved by Medications/Fever greater than (need to indicate Fahrenheit or Celsius)/Numbness, tingling, color or temperature change to extremity/Nausea and vomiting that does not stop

## 2023-11-28 NOTE — ASU DISCHARGE PLAN (ADULT/PEDIATRIC) - CARE PROVIDER_API CALL
Yariel Shepard  Orthopaedic Surgery  [address]  [place] [address]  Phone: [phone]  Fax: [phone]  Follow Up Time:

## 2023-11-28 NOTE — ASU DISCHARGE PLAN (ADULT/PEDIATRIC) - ASU DC SPECIAL INSTRUCTIONSFT
Weight bearing:     ?     You may bear full weight as tolerated using a walker, crutches or cane as needed.?You may use the walking aid which you were discharged with and switch to a cane whenever you feel comfortable doing so. You should use an assistive device until you can walk comfortably without it. Keep in mind that every patient moves at their own speed of recovery so take your time.     ?     Care of the surgical site:     ?     Apply?ice packs?to the surgical site and elevate the leg above the level of your heart when you’re at rest to reduce pain and swelling.?For icing, twenty-minute sessions followed by an hour off is recommended. You should ice as frequently as possible. Ice should NEVER be placed directly on the skin.     ?     Medications:     ?     Nonsteroidal anti-inflammatory (NSAID)?medication is prescribed to reduce pain and inflammation, unless there is a contraindication.?You've been prescribed Celebrex 100mg or Celebrex 200mg twice a day OR Meloxicam 7.5mg or 15mg daily.?Take as prescribed, even if your pain is mild.? Do not combine Meloxicam or Celebrex with over-the-counter NSAIDs (such as Advil or Aleve) or any other prescribed NSAIDs.? If you experience stomach pain or discoloration of your stool, stop the medication, and call your doctor.          Acetaminophen (Tylenol)?is prescribed to treat mild to moderate pain and to minimize the amount of narcotic medication required to manage severe pain.? You’ve been prescribed Acetaminophen 1,000mg every 6-8 hours for the first few weeks, even when pain is mild, as this will reduce how often you feel the need to take narcotics.? This medication is very safe at prescribed doses. Do not exceed 4,000mg of Acetaminophen in a 24-hour period.     ?     Narcotic (opiate)?pain medication(s) are prescribed to treat severe pain.??You've been prescribed Tramadol 50mg to take every 6 hours as needed or Oxycodone 5mg to take every 6 hours as needed.??Take these medications only if you are experiencing pain and wean off them rapidly if possible as they can be habit-forming.? Call the office if your pain is not well controlled.? If you have severe pain and are running low on medication, you may request a refill.??Refills can only be handled during regular business hours.?Remember to contact the office by 4:00 on Friday if you believe you will need medications over the weekend.?Side effects of opioids include nausea, respiratory depression, sedation, and constipation. Take a stool softener (Miralax/Senna) if you experience constipation and anti-nausea medication (Zofran/Ondansetron) if you experience nausea. Driving, operating heavy machinery and drinking alcohol are prohibited while taking these medications.        Activity and Physical Therapy Instructions:          You may feel very tired and it is important to rest when needed in addition to being as active as possible. You will find that your endurance, energy levels, and ability to ambulate improve daily.          It is essential that you bend and fully straighten your knee early in your recovery. Target getting your knee fully straight and bending to 90 degrees by 1-2 weeks after surgery.     You should start working on gentle range of motion on your own as soon as you get home from surgery.      To work on bending: when sitting in a chair, keep your leg bent off the edge- gravity will help improve your ability to bend the knee     To work on straightening: when sitting in a chair, elevate your leg and place it on chair or flat surface at same level directly across from you. You can gently press on your lower thigh to help straighten the knee.     DO NOT rest in bed with a pillow behind your knee for comfort, as this maneuver can cause you to develop a permanent flexion contracture (difficulty straightening your leg).      DO place a pillow under your heel when lying in bed, as this will help you get the knee straight.           You have been provided with a Physical Therapy (PT) prescription to start outpatient PT at the facility of your choosing to start immediately after the procedure.        High impact activities such as jumping, aerobics, tennis, and skiing are not permitted during the first 3 months after surgery. These activities can contribute to accelerated wear and should be done with caution after this time.?        Follow-up care:     ?     Follow-up in Dr. Shepard's office 2-3 weeks after surgery.??This appointment has been arranged prior to surgery but if needed, call [phone] to confirm.?     ?     Please call the office at [phone] if you experience fever > 101°F, chills, pain that is increasingly severe, redness of the wound, or unusual wound drainage.??Swelling and bruising of the leg are normal after surgery, but if swelling becomes progressively severe, please call.?     ?     If you experience chest pain or difficulty breathing,?severe, painful calf swelling?call 911 or go to the nearest?ER.??Try to avoid going to the Emergency Room for non-emergent concerns related to your surgical site – these are best managed by your surgeon.?     ?     Do not hesitate to call the office at [phone] with any problems or concerns. Weight bearing:       You may bear full weight as tolerated using a walker, crutches or cane as needed. You may use the walking aid which you were discharged with and switch to a cane whenever you feel comfortable doing so. You should use an assistive device until you can walk comfortably without it. Keep in mind that every patient moves at their own speed of recovery so take your time.       Care of the surgical site:       Apply ice packs to the surgical site and elevate the leg above the level of your heart when you’re at rest to reduce pain and swelling.?For icing, twenty-minute sessions followed by an hour off is recommended. You should ice as frequently as possible. Ice should NEVER be placed directly on the skin.     ?     Medications:     ?     Nonsteroidal anti-inflammatory (NSAID)?medication is prescribed to reduce pain and inflammation, unless there is a contraindication.?You've been prescribed Celebrex 100mg or Celebrex 200mg twice a day OR Meloxicam 7.5mg or 15mg daily.?Take as prescribed, even if your pain is mild.? Do not combine Meloxicam or Celebrex with over-the-counter NSAIDs (such as Advil or Aleve) or any other prescribed NSAIDs.? If you experience stomach pain or discoloration of your stool, stop the medication, and call your doctor.          Acetaminophen (Tylenol)?is prescribed to treat mild to moderate pain and to minimize the amount of narcotic medication required to manage severe pain.? You’ve been prescribed Acetaminophen 1,000mg every 6-8 hours for the first few weeks, even when pain is mild, as this will reduce how often you feel the need to take narcotics.? This medication is very safe at prescribed doses. Do not exceed 4,000mg of Acetaminophen in a 24-hour period.     ?     Narcotic (opiate)?pain medication(s) are prescribed to treat severe pain.??You've been prescribed Tramadol 50mg to take every 6 hours as needed or Oxycodone 5mg to take every 6 hours as needed.??Take these medications only if you are experiencing pain and wean off them rapidly if possible as they can be habit-forming.? Call the office if your pain is not well controlled.? If you have severe pain and are running low on medication, you may request a refill.??Refills can only be handled during regular business hours.?Remember to contact the office by 4:00 on Friday if you believe you will need medications over the weekend.?Side effects of opioids include nausea, respiratory depression, sedation, and constipation. Take a stool softener (Miralax/Senna) if you experience constipation and anti-nausea medication (Zofran/Ondansetron) if you experience nausea. Driving, operating heavy machinery and drinking alcohol are prohibited while taking these medications.        Activity and Physical Therapy Instructions:          You may feel very tired and it is important to rest when needed in addition to being as active as possible. You will find that your endurance, energy levels, and ability to ambulate improve daily.          It is essential that you bend and fully straighten your knee early in your recovery. Target getting your knee fully straight and bending to 90 degrees by 1-2 weeks after surgery.     You should start working on gentle range of motion on your own as soon as you get home from surgery.      To work on bending: when sitting in a chair, keep your leg bent off the edge- gravity will help improve your ability to bend the knee     To work on straightening: when sitting in a chair, elevate your leg and place it on chair or flat surface at same level directly across from you. You can gently press on your lower thigh to help straighten the knee.     DO NOT rest in bed with a pillow behind your knee for comfort, as this maneuver can cause you to develop a permanent flexion contracture (difficulty straightening your leg).      DO place a pillow under your heel when lying in bed, as this will help you get the knee straight.           You have been provided with a Physical Therapy (PT) prescription to start outpatient PT at the facility of your choosing to start immediately after the procedure.        High impact activities such as jumping, aerobics, tennis, and skiing are not permitted during the first 3 months after surgery. These activities can contribute to accelerated wear and should be done with caution after this time.?        Follow-up care:     ?     Follow-up in Dr. Shepard's office 2-3 weeks after surgery.??This appointment has been arranged prior to surgery but if needed, call [phone] to confirm.?     ?     Please call the office at [phone] if you experience fever > 101°F, chills, pain that is increasingly severe, redness of the wound, or unusual wound drainage.??Swelling and bruising of the leg are normal after surgery, but if swelling becomes progressively severe, please call.?     ?     If you experience chest pain or difficulty breathing,?severe, painful calf swelling?call 911 or go to the nearest?ER.??Try to avoid going to the Emergency Room for non-emergent concerns related to your surgical site – these are best managed by your surgeon.?     ?     Do not hesitate to call the office at [phone] with any problems or concerns. Weight bearing:       You may bear full weight as tolerated using a walker, crutches or cane as needed. You may use the walking aid which you were discharged with and switch to a cane whenever you feel comfortable doing so. You should use an assistive device until you can walk comfortably without it. Keep in mind that every patient moves at their own speed of recovery so take your time.       Care of the surgical site:       Apply ice packs to the surgical site and elevate the leg above the level of your heart when you’re at rest to reduce pain and swelling. For icing, twenty-minute sessions followed by an hour off is recommended. You should ice as frequently as possible. Ice should NEVER be placed directly on the skin.       Medications:     Nonsteroidal anti-inflammatory (NSAID) medication is prescribed to reduce pain and inflammation, unless there is a contraindication. You've been prescribed Celebrex 100mg or Celebrex 200mg twice a day OR Meloxicam 7.5mg or 15mg daily. Take as prescribed, even if your pain is mild. Do not combine Meloxicam or Celebrex with over-the-counter NSAIDs (such as Advil or Aleve) or any other prescribed NSAIDs. If you experience stomach pain or discoloration of your stool, stop the medication, and call your doctor.          Acetaminophen (Tylenol)is prescribed to treat mild to moderate pain and to minimize the amount of narcotic medication required to manage severe pain. You’ve been prescribed Acetaminophen 1,000mg every 6-8 hours for the first few weeks, even when pain is mild, as this will reduce how often you feel the need to take narcotics. This medication is very safe at prescribed doses. Do not exceed 4,000mg of Acetaminophen in a 24-hour period.       Narcotic (opiate) pain medication(s) are prescribed to treat severe pain. You've been prescribed Tramadol 50mg to take every 6 hours as needed or Oxycodone 5mg to take every 6 hours as needed. Take these medications only if you are experiencing pain and wean off them rapidly if possible as they can be habit-forming. Call the office if your pain is not well controlled. If you have severe pain and are running low on medication, you may request a refill. Refills can only be handled during regular business hours. Remember to contact the office by 4:00 on Friday if you believe you will need medications over the weekend. Side effects of opioids include nausea, respiratory depression, sedation, and constipation. Take a stool softener (Miralax/Senna) if you experience constipation and anti-nausea medication (Zofran/Ondansetron) if you experience nausea. Driving, operating heavy machinery and drinking alcohol are prohibited while taking these medications.        Activity and Physical Therapy Instructions:          You may feel very tired and it is important to rest when needed in addition to being as active as possible. You will find that your endurance, energy levels, and ability to ambulate improve daily.        It is essential that you bend and fully straighten your knee early in your recovery. Target getting your knee fully straight and bending to 90 degrees by 1-2 weeks after surgery.     You should start working on gentle range of motion on your own as soon as you get home from surgery.      To work on bending: when sitting in a chair, keep your leg bent off the edge- gravity will help improve your ability to bend the knee     To work on straightening: when sitting in a chair, elevate your leg and place it on chair or flat surface at same level directly across from you. You can gently press on your lower thigh to help straighten the knee.     DO NOT rest in bed with a pillow behind your knee for comfort, as this maneuver can cause you to develop a permanent flexion contracture (difficulty straightening your leg).      DO place a pillow under your heel when lying in bed, as this will help you get the knee straight.         You have been provided with a Physical Therapy (PT) prescription to start outpatient PT at the facility of your choosing to start immediately after the procedure.        High impact activities such as jumping, aerobics, tennis, and skiing are not permitted during the first 3 months after surgery. These activities can contribute to accelerated wear and should be done with caution after this time.?      Follow-up care:       Follow-up in Dr. Shepard's office 2-3 weeks after surgery. This appointment has been arranged prior to surgery but if needed, call [phone] to confirm.    Please call the office at [phone] if you experience fever > 101°F, chills, pain that is increasingly severe, redness of the wound, or unusual wound drainage. Swelling and bruising of the leg are normal after surgery, but if swelling becomes progressively severe, please call.    If you experience chest pain or difficulty breathing, severe, painful calf swelling call [phone] or go to the nearest ER. Try to avoid going to the Emergency Room for non-emergent concerns related to your surgical site – these are best managed by your surgeon.    Do not hesitate to call the office at [phone] with any problems or concerns.

## 2023-12-01 ENCOUNTER — NON-APPOINTMENT (OUTPATIENT)
Age: 58
End: 2023-12-01

## 2023-12-10 ENCOUNTER — NON-APPOINTMENT (OUTPATIENT)
Age: 58
End: 2023-12-10

## 2023-12-20 ENCOUNTER — APPOINTMENT (OUTPATIENT)
Dept: ORTHOPEDIC SURGERY | Facility: CLINIC | Age: 58
End: 2023-12-20
Payer: MEDICARE

## 2023-12-20 ENCOUNTER — NON-APPOINTMENT (OUTPATIENT)
Age: 58
End: 2023-12-20

## 2023-12-20 VITALS
HEART RATE: 89 BPM | DIASTOLIC BLOOD PRESSURE: 82 MMHG | WEIGHT: 162 LBS | BODY MASS INDEX: 25.43 KG/M2 | OXYGEN SATURATION: 99 % | SYSTOLIC BLOOD PRESSURE: 129 MMHG | HEIGHT: 67 IN

## 2023-12-20 PROCEDURE — 73562 X-RAY EXAM OF KNEE 3: CPT | Mod: LT

## 2023-12-20 PROCEDURE — 99024 POSTOP FOLLOW-UP VISIT: CPT

## 2023-12-20 NOTE — HISTORY OF PRESENT ILLNESS
[de-identified] : Patient comes in with her daughter who provided translation at her request.  She is 3 weeks status post left knee manipulation.  She continues have discomfort and stiffness in the knee.  She states she is doing her therapy and home exercises.  She reports taking acetaminophen and diclofenac for pain.  The cold bothers it more.  She is using a cane. [de-identified] : On exam she is alert and oriented.  She is walking with a antalgic stifflegged gait.  Range of motion is from +5-80 5 over 90 degrees today. [de-identified] : 3 views of the knee show a well aligned prosthesis with no hardware complication loosening fracture or dislocation. [de-identified] : 3 weeks S/P manipulation of left total knee replacement, with progression in range of motion compared to what we achieved in the operating room as well as continued pain.  We reviewed flexion and extension exercises.  Number to try to obtain an extension brace to help with her extension.  We discussed the importance of pain control and we will provide her with some oxycodone for use as needed.  Instructed to continue with anti-inflammatories and acetaminophen as well.  She is going to [place] for several weeks over the holidays and on providing an outpatient prescription for there so she can continue to have therapy.  We reviewed some water exercises that may be helpful for her.  I would like to follow-up with her at the end of January early February when she returns but she will hesitate to contact in the interim with any significant change or concern.  All questions answered.

## 2024-01-25 NOTE — ASU PATIENT PROFILE, ADULT - CAREGIVER
Progress Note    Indication: DVT, PE (3/24/22)  Goal INR: 2.0-3.0 (aim for 2.5-3.0 per hospital notes)  Duration: Indefinite  Referring Provider: Antonio Amato MD  Order Expiration Date: 08/23/2024  Pertinent History: HFpEF, Afib, DVT, PE, DM      Assessment  Yes No   []  [x] Missed doses:    []  [x] Extra doses:   []  [x] Significant medication changes (RX, OTC, Herbal):    []  [x] High-risk maintenance medications:    []  [x] Vitamin K / dietary changes (Vitamin K goal: 7 cups/week):    []  [x] Bleeding / bruising:    []  [x] Falls / injury:     []  [x] Acute illness:    []  [x] Alcohol intake:    []  [x] Procedures / hospitalization / ER visits: 3/28/22-Pt hospitalized 3/23-3/27 at [facility] c/o 1 week of progressively worsening SOB at rest. SOB likely 2/2 HF exacerbation vs Pulmonary embolism. CTA Chest PE: limited study but apparent intraluminal filling defects in segmental pulmonary arteries of the RU and LL which may represent acute PE wo R heart strain. Venous duplex b/l-no DVT.  Initial INR 1.9. IV heparin. No DOAC 2/2 BMI. Inpatient pharmacy recommend continuing with warfarin 2.5-3.0. Patient went home and then sustained a fall. Returned to hospital on 3/28-4/1/22. Work-up significant for advanced arthritis. Transitioned to the skilled rehab for physical therapy. It appears patient was receiving Warfarin 2.5mg while at SNF.  PT WAS DISCHARGED FROM HOSPITAL  10/27   []  [x] Other:       Plan (2.5mg, 3mg tablets)  INR Result: 2.3        HOME COAG MACHINE                        The INR result for today is therapeutic   based on the INR goal 2.0-3.0. (2.5-3.0) (aim for 2.5-3.0 per hospital notes)   Etiology : Pt had too much vegetable    Recommended Dose: To take warfarin 2.5mg q daily.  Follow up:  2/1/2024  Comments: Denies any bleeding. Instructed pt that she will need to take extra 1/2 of 2.5mg today. Pt just wants to cut down her vegetables.  Pt was supratherapeutic a few weeks  ago.      Counseling  Counseled about signs/symptoms of bruising/bleeding and appropriate management  Counseled about signs/symptoms of thrombosis and/or stroke and when to seek emergent care  Stressed importance of compliance with exact recommended dosage regimen  Discussed measures to reduce risk for falls and appropriate action when serious injury occurs  Instructed to notify clinic about medication changes or health status changes  Discussed risk of thrombosis and/or bleeding with inappropriate anticoagulant use and monitoring frequency    Provided home health nurse with verbal dosing instructions, home health nurse endorsed plan to patient/caregiver who verbalized understanding.     Kurt Barahona RN  1/25/2024   Yes

## 2024-02-21 ENCOUNTER — APPOINTMENT (OUTPATIENT)
Dept: ORTHOPEDIC SURGERY | Facility: CLINIC | Age: 59
End: 2024-02-21
Payer: MEDICARE

## 2024-02-21 VITALS
SYSTOLIC BLOOD PRESSURE: 160 MMHG | WEIGHT: 162 LBS | HEIGHT: 67 IN | DIASTOLIC BLOOD PRESSURE: 90 MMHG | OXYGEN SATURATION: 98 % | BODY MASS INDEX: 25.43 KG/M2 | HEART RATE: 104 BPM

## 2024-02-21 PROCEDURE — 99213 OFFICE O/P EST LOW 20 MIN: CPT

## 2024-02-22 LAB
CRP SERPL-MCNC: <3 MG/L
ERYTHROCYTE [SEDIMENTATION RATE] IN BLOOD BY WESTERGREN METHOD: 22 MM/HR

## 2024-02-22 NOTE — HISTORY OF PRESENT ILLNESS
[de-identified] : Patient is a pleasant 58-year-old woman who comes in for follow-up evaluation of her left knee.  She is here with her daughter who provided translation at patient's request.  She just returned from [place] where things were feeling bit better however she still feels the knee is very stiff.  She stands for long periods of time she feels like she gets some swelling.  She is using a cane.  She was not able to get the knee extension brace due to insurance issues.  She has been trying to work on home exercises.  She denies fevers or chills.

## 2024-02-22 NOTE — PHYSICAL EXAM
[de-identified] : On exam she is alert and oriented.  She is walking with some antalgia.  Her range of motion is from +5 to 80 degrees.  She is neurologically intact.  There is some mild swelling to the knee but no instability.

## 2024-02-22 NOTE — DISCUSSION/SUMMARY
[de-identified] : Status post left total knee arthroplasty with arthrofibrosis.  I suspect this is related at least in part throughout her recovery.  To difficulties with pain control this does not appear consistent with infection.  I am going to send her for screening ESR and CRP.  If there is significant elevation we may consider aspiration.  I am also going to send her for consultation regarding the possibility of arthroscopic debridement with a second manipulation.  We discussed home exercises she can work on in the interim as well as ice and heat.  We will follow-up after these evaluations are accomplished.  Addendum:  I suspicion for infection is low but we will continue to follow closely ESR mildly elevated at 22 CRP within normal limits.  Plan to follow-up after sports med evaluation.  I did discuss the case with the sports attending today.

## 2024-02-27 NOTE — PATIENT PROFILE ADULT - PATIENT REPRESENTATIVE: ( YOU CAN CHOOSE ANY PERSON THAT CAN ASSIST YOU WITH YOUR HEALTH CARE PREFERENCES, DOES NOT HAVE TO BE A SPOUSE, IMMEDIATE FAMILY OR SIGNIFICANT OTHER/PARTNER)
[facility]    PM&R CLINIC NOTE  BOTULINUM TOXIN PROCEDURE      HPI  No chief complaint on file.    Samara Oropeza is a 27 year old female who presents to clinic for botulinum toxin injections for management of chronic migraine headaches.     SINCE LAST VISIT  Samara Oropeza was last seen here in clinic on 12/5/2023, at which time she received 200 units of Botox.     She explains this time Botox took effect in about a month, and she was having more HA's during the time period. She does not endorse facial muscle movements prior to Botox taking effect. She does not have any new changes in her life. Raising her daughter mostly, who has not started sleeping through the night, giving her about 4 hrs of sleep at night. She does not add in daytime naps. She has an implant in her arm and has not had a period since the birth of her child.     Patient denies new medical diagnoses, illnesses, hospitalizations, emergency room visits, and injuries since the previous injection with botulinum neurotoxin.    RESPONSE TO PREVIOUS TREATMENT    Side effects: No problems reported    1.  Headache frequency during this injection cycle: The first month  was tough. At about 3 weeks pror to appointment has wearing oddd of effectiveness     2.  Headache duration during this injection cycle:  Headache duration was usually a few hours to a few days . Patient reports a few episodes of multiple day headaches during this injection cycle, particularly as the Botox was wearing off.     3.  Headache intensity during this injection cycle:    A.  7/10  =  Typical pain level.  B.  10/10  =  Worst pain level.    C.  2/10  =  Lowest pain level.    4.  Change in headache medication usage during this injection cycle:  (For Example:  Able to decrease use of oral pain medications.) She has been taking Tylenol and ibuprofen as needed for her migraine headaches. She will take rizatriptan if the OTC medications do not work.  She has taken more rizatriptan this injection cycle due to more headaches.     5.  ER Visits During This Injection Cycle:  None.     6.  Functional Performance:  Change in ADL's, social interaction, days lost from work, etc. Patient reports being able to more fully participate in social and family activities and responsibilities as headache symptoms have improved      PHYSICAL EXAM  VS: There were no vitals taken for this visit.   GEN: Pleasant and cooperative, in no acute distress  HEENT: No facial asymmetry    ALLERGIES  Allergies   Allergen Reactions    Amoxil [Penicillins] Rash     Dad unsure of reaction.    Bee Venom Anaphylaxis    Bioflavonoids Anaphylaxis    Citrus Anaphylaxis     All [place]    Contrast Dye Rash     Contrast Media Ready-Box [facility], 04/09/2014.; Contrast Media Ready-Box [facility], 04/09/2014.  NOTE: this is a contrast media oral with iodine. Premedicate with methylpred standard for IV contrast, request barium contrast for oral contrast.    Iodinated Contrast Media Hives and Rash     Contrast Media Ready-Box [facility], 04/09/2014.; Contrast Media Ready-Box [facility], 04/09/2014.  NOTE: this is a contrast media oral with iodine. Premedicate with methylpred standard for IV contrast, request barium contrast for oral contrast.    Pineapple Anaphylaxis, Difficulty breathing and Rash    Reglan [Metoclopramide] Other (See Comments)     IV dose only, in ER, rapid heart rate.    Ace Inhibitors      Difficulty in breathing and GI upset    Amitiza [Lubiprostone] Nausea and Vomiting    Amoxicillin-Pot Clavulanate     Midazolam Unknown     parent states that when pt takes this medication, she wakes up being very violent .    Midazolam Hcl      Coming out of pelvic exam at age of 4, was kicking and screaming when coming out of the versed.    Other [No Clinical Screening - See Comments]      Bleech/ chest tightness, itchy throat, swollen tongue, hives    Tizanidine Other (See Comments)     Confusion, back pain, photophobia,  abdominal pain, shaking, anxious      Adhesive Tape Rash    Azithromycin Hives and Rash    Cephalexin Itching and Rash    Sulfa Antibiotics Rash     Skin scarring       CURRENT MEDICATIONS    Current Outpatient Medications:     albuterol (PROAIR HFA/PROVENTIL HFA/VENTOLIN HFA) 108 (90 Base) MCG/ACT inhaler, Inhale 2 puffs into the lungs every 6 hours as needed for shortness of breath / dyspnea or wheezing, Disp: 1 Inhaler, Rfl: 1    amitriptyline (ELAVIL) 25 MG tablet, TAKE 1 TABLET BY MOUTH EVERY NIGHT AT BEDTIME, Disp: 90 tablet, Rfl: 1    apremilast (OTEZLA) 30 MG tablet, Take 1 tablet (30 mg) by mouth 2 times daily Hold for signs of infection, and seek medical attention., Disp: 180 tablet, Rfl: 3    artificial tears OINT ophthalmic ointment, 0.5 inch strip each eye at night, Disp: 1 Tube, Rfl: 11    azelaic acid (FINACIA) 15 % external gel, Once daily to scars, upper chest and small portion of back and spot on belly button. Hold if irritating, Disp: 50 g, Rfl: 5    benzocaine (AMERICAINE) 20 % external aerosol, Apply to perineum four times daily as needed for pain, Disp: 57 g, Rfl: 3    benzocaine (TOPICALE XTRA) 20 % GEL, Apply as needed locally to mouth or nasal ulcers for pain; 4 times daily as needed, Disp: 30 g, Rfl: 1    betamethasone valerate (VALISONE) 0.1 % cream, Apply topically 2 times daily as needed , Disp: , Rfl:     bisacodyl (DULCOLAX) 5 MG EC tablet, Take 2 tablets (10 mg) by mouth daily as needed for constipation, Disp: 30 tablet, Rfl: 0    citalopram (CELEXA) 20 MG tablet, Take 1 tablet (20 mg) by mouth daily, Disp: 90 tablet, Rfl: 1    EPINEPHrine (EPIPEN 2-EAMON) 0.3 MG/0.3ML injection, Inject 0.3 mLs (0.3 mg) into the muscle as needed for anaphylaxis, Disp: 0.6 mL, Rfl: 3    etonogestrel (NEXPLANON) 68 MG IMPL, Inject 68 mg Subcutaneous, Disp: , Rfl:     fluocinonide (LIDEX) 0.05 % external gel, Apply topically 2 times daily, Disp: 60 g, Rfl: 11    gabapentin (NEURONTIN) 300 MG capsule, Take  1 capsule (300 mg) by mouth every morning, Disp: 60 capsule, Rfl: 1    hydrocortisone, Perianal, (ANUSOL-HC) 2.5 % cream, Place rectally 3 times daily as needed for hemorrhoids, Disp: 30 g, Rfl: 0    hydroxychloroquine (PLAQUENIL) 200 MG tablet, Take 1 tablet (200 mg) by mouth 2 times daily (with meals) Annual Plaquenil toxicity eye screening required., Disp: 60 tablet, Rfl: 11    hydrOXYzine HCl (ATARAX) 25 MG tablet, TAKE ONE OR TWO TABLETS BY MOUTH EVERY SIX HOURS AS NEEDED, Disp: , Rfl:     hypromellose (GENTEAL) 0.3 % SOLN, 1 drop every hour as needed for dry eyes , Disp: , Rfl:     lactulose 20 GM/30ML SOLN, Take 30 mLs by mouth 3 times daily as needed (for constipation), Disp: 300 mL, Rfl: 3    lanolin ointment, Apply topically every hour as needed for other (sore nipples), Disp: 7 g, Rfl: 3    lidocaine (LMX4) 4 % external cream, Apply topically once as needed for mild pain, Disp: 120 g, Rfl: 1    LINZESS 290 MCG capsule, TAKE 1 CAPSULE BY MOUTH EVERY DAY IN THE MORNING BEFORE BREAKFAST, Disp: 90 capsule, Rfl: 0    mometasone (ELOCON) 0.1 % external ointment, Apply topically 2 times daily, Disp: 45 g, Rfl: 11    ondansetron (ZOFRAN ODT) 8 MG ODT tab, Take 1 tablet by mouth every 8 hours as needed, Disp: , Rfl:     polyethylene glycol (MIRALAX/GLYCOLAX) powder, Take 1 capful by mouth 3 times daily, Disp: , Rfl:     Prenatal MV-Min-Fe Fum-FA-DHA (PRENATAL 1 PO), , Disp: , Rfl:     rizatriptan (MAXALT-MLT) 5 MG ODT, DISSOLVE 1 OR 2 TABLETS IN THE MOUTH AS NEEDED FOR HEADACHE AT ONSET OF MIGRAINE, MAY REPEAT IN 2 HOURS AS NEEDED. MAX 30MG IN 24 HOURS AND MAX 5 DAYS PER MONTH, Disp: , Rfl:     sodium fluoride 1.1 % CREA, Apply 1 Application topically daily, Disp: , Rfl:     sucralfate (CARAFATE) 1 GM/10ML suspension, Take 10 mLs (1 g) by mouth 4 times daily (Patient not taking: Reported on 2/1/2024), Disp: 1200 mL, Rfl: 2    triamcinolone (KENALOG) 0.1 % external ointment, Apply twice daily as needed to lesions  on the genitals and body. OK to use very sparingly on the face., Disp: 454 g, Rfl: 2    Current Facility-Administered Medications:     botulinum toxin type A (BOTOX) 100 units injection 200 Units, 200 Units, Intramuscular, Q90 Days, Alejandrina Hernandez MD, 200 Units at 23 1416    triamcinolone (KENALOG-40) injection 40 mg, 40 mg, , , Fleischmann, Andres, DPM, 40 mg at 10/11/21 0928    triamcinolone (KENALOG-40) injection 40 mg, 40 mg, , , Fleischmann, Andres, DPM, 40 mg at 06/15/21 0957    triamcinolone (KENALOG-40) injection 40 mg, 40 mg, , , Fleischmann, Andres, DPM, 40 mg at 09/15/20 1554       BOTULINUM NEUROTOXIN INJECTION PROCEDURES    VERIFICATION OF PATIENT IDENTIFICATION AND PROCEDURE     Initials   Patient Name SES   Patient  SES   Procedure Verified by: SES     Prior to the start of the procedure and with procedural staff participation, I verbally confirmed the patient s identity using two indicators, relevant allergies, that the procedure was appropriate and matched the consent or emergent situation, and that the correct equipment/implants were available. Immediately prior to starting the procedure I conducted the Time Out with the procedural staff and re-confirmed the patient s name, procedure, and site/side. (The Joint Commission universal protocol was followed.)  Yes    Sedation (Moderate or Deep): None    ABOVE ASSESSMENTS PERFORMED BY    MD Kae Hinkle MD (PM&R resident)          The attending provider was present for the entire procedure documented below.       INDICATIONS FOR PROCEDURES  Samara Oropeza is a 27 year old patient with pain secondary to the diagnosis of chronic migraine headaches. Her baseline symptoms have been recalcitrant to oral medications and conservative therapy.  She is here today for reinjection with Botox.    GOAL OF PROCEDURE  The goal of this procedure is to decrease pain.      TOTAL DOSE ADMINISTERED  Dose Administered:  200  units  Botox (Botulinum Toxin Type A)       2:1 Dilution   Unavoidable Drug Waste: No.  Diluent Used:  Preservative Free Normal Saline  Total Volume of Diluent Used:  4 ml  NDC #: Botox 100u ([address]-09)      CONSENT  The risks, benefits, and treatment options were discussed with Samara Oropeza and she agreed to proceed.    Written consent was obtained by  [facility] .     EQUIPMENT USED  Needle-25mm stimulating/recording  Needle-30 gauge  EMG/NCS Machine    SKIN PREPARATION  Skin preparation was performed using an alcohol wipe.    GUIDANCE DESCRIPTION  Electro-myographic guidance was necessary throughout the neck portion of the procedure to accurately identify all areas of spastic muscles while avoiding injection of non-spastic muscles, neighboring nerves and nearby vascular structures.       AREA/MUSCLE INJECTED:  200 UNITS BOTOX = TOTAL DOSE, 2:1 DILUTION     1. SHOULDER GIRDLE & NECK MUSCLES: 60 UNITS BOTOX = TOTAL DOSE     Right Splenius Cervicis - 5 units of Botox over 2 site/s.   Left Splenius Cervicis - 5 units of Botox over 2 site/s.     Right Rectus Capitis - 2.5 units of Botox at 1 site.  Left Rectus Capitis - 2.5 units of Botox at 1site.     Right Levator Scapulae - 10 units of Botox over 2 site/s.   Left Levator Scapulae - 10 units of Botox over 2 site/s.    Right Anterior Scalene - 2.5 units of Botox over 1 site/s.   Left Anterior Scalene - 2.5 units of Botox over 1 site/s.     Right Sternocleidomastoid - 2.5 units of Botox over 1 site/s.   Left Sternocleidomastoid - 2.5 units of Botox over 1 site/s.     Right Rhomboid Major - 5 units of Botox over 1 site/s.    Left Rhomboid Major - 5 units of Botox over 1 site/s.      Right Pectoralis Minor - 2.5 units of Botox over 1 site/s.    Left Pectoralis Minor - 2.5 units of Botox over 1 site/s.     2. FACIAL & SCALP MUSCLES: 140 UNITS BOTOX = TOTAL DOSE     Right Occipitalis - 5 units of Botox over 1 site/s.   Left Occipitalis - 5 units of Botox over 1  site/s.     Right Frontalis - 10 units of Botox over 2 site/s.  Left Frontalis - 10 units of Botox over 2 site/s.     Right Temporalis - 50 units of Botox over 8 site/s.  Left Temporalis - 50 units of Botox over 8 site/s.     Right  - 2.5 units of Botox over 1 site/s.              Left  - 2.5 units of Botox over 1 site/s.                 Procerus - 5 units of Botox at 1 site/s.      BILATERAL GREATER & LESSER OCCIPITAL NERVE BLOCKS, TRIGGER POINT INJECTIONS  1% lidocaine: 10 ml  Methylprednisolone: 40 mg = 1 ml     ONB: Area just inferior to insertion of the right and left superior trapezius insertion onto skull was cleansed with ChloraPrep. Needle was advanced anteriorly to base of skull then slightly withdrawn and injectate was injected in a fan-like distribution at different depths. An 8 ml mixture of 1% lidocaine, and methylprednisolone was divided into two 5 ml syringes. Total injection volume = 4 ml per side.     TPI: Areas of the skin were prepped with ChloraPrep.  Using standard precautions, a 30-gauge 1-inch needle was used to inject a 3 ml mixture of the above medications into the upper trapezius and rhomboid major/minor muscles bilaterally for a total of 8 sites.         RESPONSE TO PROCEDURE  Samara Oropeza tolerated the procedure well and there were no immediate complications. She was allowed to recover for an appropriate period of time and was discharged home in stable condition.    ASSESSMENT AND PLAN   Botulinum toxin injections: No changes made to Botox dose or distribution today. Occipital nerve blocks and trigger point injections also administered per patient request to address occipital neuralgia and myofascial pain. Patient will continue to monitor response to Botox and report at next appointment.   Referrals: None.   Follow up: Samara Oropeza was rescheduled for the next series of injections in 12 weeks, at which time we will evaluate response to today's  injections. she may call the clinic prior with any questions or concerns prior to the next appointment.     I, Alejandrina Hernandez MD, saw this patient with resident, Dr. Smith, and agree with the findings and plan of care as documented in this note. I personally reviewed the chart (vitals signs, medications, labs and imaging). My key findings and key management decisions made are reflected in this note.  I was present for the entire procedure listed above.      Alejandrina Hernandez MD        declines

## 2024-03-20 ENCOUNTER — APPOINTMENT (OUTPATIENT)
Dept: ORTHOPEDIC SURGERY | Facility: CLINIC | Age: 59
End: 2024-03-20
Payer: MEDICARE

## 2024-03-20 VITALS — HEIGHT: 67 IN | RESPIRATION RATE: 16 BRPM | WEIGHT: 144 LBS | BODY MASS INDEX: 22.6 KG/M2

## 2024-03-20 DIAGNOSIS — Z96.652 PRESENCE OF LEFT ARTIFICIAL KNEE JOINT: ICD-10-CM

## 2024-03-20 DIAGNOSIS — T84.82XD FIBROSIS DUE TO INTERNAL ORTHOPEDIC PROSTHETIC DEVICES, IMPLANTS AND GRAFTS, SUBSEQUENT ENCOUNTER: ICD-10-CM

## 2024-03-20 PROCEDURE — 99213 OFFICE O/P EST LOW 20 MIN: CPT

## 2024-06-14 ENCOUNTER — APPOINTMENT (OUTPATIENT)
Dept: ORTHOPEDIC SURGERY | Facility: CLINIC | Age: 59
End: 2024-06-14

## 2024-06-19 ENCOUNTER — APPOINTMENT (OUTPATIENT)
Dept: ORTHOPEDIC SURGERY | Facility: CLINIC | Age: 59
End: 2024-06-19
Payer: MEDICARE

## 2024-06-19 VITALS
SYSTOLIC BLOOD PRESSURE: 126 MMHG | WEIGHT: 140 LBS | HEART RATE: 69 BPM | OXYGEN SATURATION: 100 % | DIASTOLIC BLOOD PRESSURE: 80 MMHG | HEIGHT: 67 IN | RESPIRATION RATE: 18 BRPM | BODY MASS INDEX: 21.97 KG/M2

## 2024-06-19 PROCEDURE — 99212 OFFICE O/P EST SF 10 MIN: CPT

## 2024-06-19 RX ORDER — DICLOFENAC SODIUM 50 MG/1
50 TABLET, DELAYED RELEASE ORAL
Qty: 60 | Refills: 0 | Status: ACTIVE | COMMUNITY
Start: 2024-06-19 | End: 1900-01-01

## 2024-06-19 RX ORDER — CELECOXIB 200 MG/1
200 CAPSULE ORAL
Qty: 60 | Refills: 0 | Status: DISCONTINUED | COMMUNITY
Start: 2023-08-25 | End: 2024-06-19

## 2024-06-19 RX ORDER — ASPIRIN 81 MG/1
81 TABLET, DELAYED RELEASE ORAL
Qty: 60 | Refills: 0 | Status: DISCONTINUED | COMMUNITY
Start: 2023-08-25 | End: 2024-06-19

## 2024-06-19 RX ORDER — ACETAMINOPHEN 500 MG/1
500 TABLET ORAL EVERY 8 HOURS
Qty: 84 | Refills: 0 | Status: DISCONTINUED | COMMUNITY
Start: 2023-08-25 | End: 2024-06-19

## 2024-06-19 RX ORDER — FAMOTIDINE 20 MG/1
20 TABLET, FILM COATED ORAL DAILY
Qty: 30 | Refills: 0 | Status: DISCONTINUED | COMMUNITY
Start: 2023-08-25 | End: 2024-06-19

## 2024-06-19 RX ORDER — ONDANSETRON 4 MG/1
4 TABLET, ORALLY DISINTEGRATING ORAL
Qty: 16 | Refills: 0 | Status: DISCONTINUED | COMMUNITY
Start: 2023-08-25 | End: 2024-06-19

## 2024-06-19 RX ORDER — OXYCODONE 5 MG/1
5 TABLET ORAL
Qty: 25 | Refills: 0 | Status: DISCONTINUED | COMMUNITY
Start: 2023-09-27 | End: 2024-06-19

## 2024-06-19 RX ORDER — HYDROMORPHONE HYDROCHLORIDE 2 MG/1
2 TABLET ORAL
Qty: 42 | Refills: 0 | Status: DISCONTINUED | COMMUNITY
Start: 2023-11-29 | End: 2024-06-19

## 2024-06-19 RX ORDER — POLYETHYLENE GLYCOL 3350 17 G/17G
17 POWDER, FOR SOLUTION ORAL
Qty: 14 | Refills: 0 | Status: DISCONTINUED | COMMUNITY
Start: 2023-08-25 | End: 2024-06-19

## 2024-06-19 NOTE — DISCUSSION/SUMMARY
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 I advised she can wear a compression knee sleeve while walking to help prevent swelling. She would like to try PT again- rx given. Pain management discussed including ice, heat, Tylenol and NSAIDs as needed. Rx for Diclofenac sent but I advised her to check with her PCP first given her recent anemia hx. She has not decided on whether or not she wants to pursue arthroscopic debridement of the knee. She will follow up in 3 months as discussed and will not hesitate to contact the office in the interim with any questions/concerns/issues.

## 2024-06-19 NOTE — PHYSICAL EXAM
[de-identified] : General: AxO x 3   Neuro: 5/5 strength with foot eversion, foot inversion, dorsiflexion, plantar flexion, sensation is intact over the lower extremity in L2-S1 nerve distributions. 2+ dorsalis pedis and posterior tibial pulses. Negative Homans sign   Skin: incision well healed, no signs of infection  Knee: No obvious effusion. ROM: 5-80 No AP or VV instability  [de-identified] : No new x-rays today

## 2024-06-19 NOTE — HISTORY OF PRESENT ILLNESS
[de-identified] : ASHLIE GUTIÉRREZ is known to me for s/p left knee replacement on date of surgery: 9/5/23 and KIM on 11/28/23.  She is here with her daughter who provided translation at patient's request. Since we last saw her, she is about the same. She notes some pain and swelling when walking and this limits her. She also has decreased range of motion. She did see Dr. Vicente for opinion re: arthroscopic removal of scar tissue but she hasn't chosen to pursue this just yet. A few months ago, she was hospitalized for anemia and had a blood transfusion. She is using a cane to ambulate. At this time, she feels most limited by swelling that occurs when she's walking and stiffness in the knee. Denies any fevers and chills or other signs of infection.

## 2024-06-19 NOTE — REVIEW OF SYSTEMS
[Arthralgia] : arthralgia [Joint Pain] : joint pain [Joint Swelling] : joint swelling [Negative] : Respiratory [Joint Stiffness] : no joint stiffness [Fever] : no fever [Chills] : no chills

## 2024-09-11 ENCOUNTER — APPOINTMENT (OUTPATIENT)
Dept: ORTHOPEDIC SURGERY | Facility: CLINIC | Age: 59
End: 2024-09-11
Payer: MEDICARE

## 2024-09-11 VITALS
HEART RATE: 62 BPM | OXYGEN SATURATION: 98 % | SYSTOLIC BLOOD PRESSURE: 151 MMHG | BODY MASS INDEX: 23.23 KG/M2 | WEIGHT: 148 LBS | HEIGHT: 67 IN | DIASTOLIC BLOOD PRESSURE: 82 MMHG

## 2024-09-11 DIAGNOSIS — T84.82XD FIBROSIS DUE TO INTERNAL ORTHOPEDIC PROSTHETIC DEVICES, IMPLANTS AND GRAFTS, SUBSEQUENT ENCOUNTER: ICD-10-CM

## 2024-09-11 DIAGNOSIS — Z96.652 PRESENCE OF LEFT ARTIFICIAL KNEE JOINT: ICD-10-CM

## 2024-09-11 PROCEDURE — 73564 X-RAY EXAM KNEE 4 OR MORE: CPT | Mod: LT

## 2024-09-11 PROCEDURE — 99213 OFFICE O/P EST LOW 20 MIN: CPT | Mod: 25

## 2024-09-11 NOTE — DISCUSSION/SUMMARY
[de-identified] : 1 year S/P total knee replacement, with continued stiffness despite attempts to treat this with physical therapy and home exercises pain and edema control modalities and manipulation under anesthesia last November.  We discussed this at length with her.  At this point we could consider continued nonoperative treatment or arthroscopic lysis of adhesions or knee revision.  Given the overall appearance of the knee and the range of motion noted above and her function I would favor arthroscopic lysis of adhesions before undertaking a full knee revision.  Could perform a manipulation under anesthesia simultaneously.  This does carry some risks and we discussed that and I cannot definitively say upfront how much motion we could obtain or retain.  However the risks are less then with a full revision.  She is in agreement with that approach.  We are going to arrange a sports medicine consultation I have discussed the case with my sports medicine colleague.  As part of the preoperative labs were I do recommend a ESR and CRP although there is no indication of infection with previous labs.  Patient is agreement this plan and all questions of the patient and daughter were answered.

## 2024-09-11 NOTE — PHYSICAL EXAM
[de-identified] : Physical exam she is alert and oriented.  Her range of motion is from 2 -75 degrees.  She does have pain at the extreme of flexion.  There is no instability to the knee.  She is otherwise neurologically intact and has a negative Homans. [de-identified] : 4 views of the knee are ordered and reviewed today to assess her knee arthroplasty. There is a well aligned knee arthroplasty with no hardware complication loosening fracture or dislocation.

## 2024-09-25 ENCOUNTER — APPOINTMENT (OUTPATIENT)
Dept: ORTHOPEDIC SURGERY | Facility: CLINIC | Age: 59
End: 2024-09-25
Payer: MEDICARE

## 2024-09-25 ENCOUNTER — NON-APPOINTMENT (OUTPATIENT)
Age: 59
End: 2024-09-25

## 2024-09-25 VITALS
HEART RATE: 70 BPM | OXYGEN SATURATION: 99 % | HEIGHT: 67 IN | DIASTOLIC BLOOD PRESSURE: 83 MMHG | WEIGHT: 148 LBS | SYSTOLIC BLOOD PRESSURE: 148 MMHG | BODY MASS INDEX: 23.23 KG/M2 | RESPIRATION RATE: 18 BRPM

## 2024-09-25 DIAGNOSIS — T84.82XD FIBROSIS DUE TO INTERNAL ORTHOPEDIC PROSTHETIC DEVICES, IMPLANTS AND GRAFTS, SUBSEQUENT ENCOUNTER: ICD-10-CM

## 2024-09-25 DIAGNOSIS — Z96.652 PRESENCE OF LEFT ARTIFICIAL KNEE JOINT: ICD-10-CM

## 2024-09-25 PROCEDURE — 99213 OFFICE O/P EST LOW 20 MIN: CPT

## 2024-09-27 RX ORDER — ACETAMINOPHEN 500 MG/1
500 TABLET ORAL
Qty: 90 | Refills: 0 | Status: ACTIVE | COMMUNITY
Start: 2024-09-27 | End: 1900-01-01

## 2024-09-27 RX ORDER — DOCUSATE SODIUM 100 MG/1
100 CAPSULE, LIQUID FILLED ORAL
Qty: 10 | Refills: 0 | Status: ACTIVE | COMMUNITY
Start: 2024-09-27 | End: 1900-01-01

## 2024-09-27 RX ORDER — DICLOFENAC SODIUM 50 MG/1
50 TABLET, DELAYED RELEASE ORAL
Qty: 30 | Refills: 0 | Status: ACTIVE | COMMUNITY
Start: 2024-09-27 | End: 1900-01-01

## 2024-09-27 RX ORDER — OXYCODONE 5 MG/1
5 TABLET ORAL
Qty: 20 | Refills: 0 | Status: ACTIVE | COMMUNITY
Start: 2024-09-27 | End: 1900-01-01

## 2024-09-30 ENCOUNTER — NON-APPOINTMENT (OUTPATIENT)
Age: 59
End: 2024-09-30

## 2024-10-03 ENCOUNTER — APPOINTMENT (OUTPATIENT)
Age: 59
End: 2024-10-03

## 2024-10-03 PROCEDURE — 29884 ARTHRS KNEE SURG LYSIS ADS: CPT | Mod: LT

## 2024-10-16 ENCOUNTER — APPOINTMENT (OUTPATIENT)
Dept: ORTHOPEDIC SURGERY | Facility: CLINIC | Age: 59
End: 2024-10-16
Payer: MEDICARE

## 2024-10-16 VITALS — WEIGHT: 148 LBS | BODY MASS INDEX: 23.23 KG/M2 | HEIGHT: 67 IN | RESPIRATION RATE: 18 BRPM

## 2024-10-16 DIAGNOSIS — T84.82XD FIBROSIS DUE TO INTERNAL ORTHOPEDIC PROSTHETIC DEVICES, IMPLANTS AND GRAFTS, SUBSEQUENT ENCOUNTER: ICD-10-CM

## 2024-10-16 DIAGNOSIS — Z96.652 PRESENCE OF LEFT ARTIFICIAL KNEE JOINT: ICD-10-CM

## 2024-10-16 PROCEDURE — 99024 POSTOP FOLLOW-UP VISIT: CPT

## 2024-10-16 RX ORDER — DICLOFENAC SODIUM 75 MG/1
75 TABLET, DELAYED RELEASE ORAL
Qty: 60 | Refills: 1 | Status: ACTIVE | COMMUNITY
Start: 2024-10-16 | End: 1900-01-01

## 2024-10-25 ENCOUNTER — NON-APPOINTMENT (OUTPATIENT)
Age: 59
End: 2024-10-25

## 2024-11-13 ENCOUNTER — APPOINTMENT (OUTPATIENT)
Dept: ORTHOPEDIC SURGERY | Facility: CLINIC | Age: 59
End: 2024-11-13
Payer: MEDICARE

## 2024-11-13 VITALS — WEIGHT: 148 LBS | BODY MASS INDEX: 23.23 KG/M2 | RESPIRATION RATE: 18 BRPM | HEIGHT: 67 IN

## 2024-11-13 DIAGNOSIS — T84.82XD FIBROSIS DUE TO INTERNAL ORTHOPEDIC PROSTHETIC DEVICES, IMPLANTS AND GRAFTS, SUBSEQUENT ENCOUNTER: ICD-10-CM

## 2024-11-13 PROCEDURE — 99024 POSTOP FOLLOW-UP VISIT: CPT

## 2024-11-13 RX ORDER — CELECOXIB 100 MG/1
100 CAPSULE ORAL
Qty: 120 | Refills: 0 | Status: ACTIVE | COMMUNITY
Start: 2024-11-13 | End: 1900-01-01

## 2024-11-14 ENCOUNTER — NON-APPOINTMENT (OUTPATIENT)
Age: 59
End: 2024-11-14

## 2024-11-14 ENCOUNTER — APPOINTMENT (OUTPATIENT)
Dept: COLORECTAL SURGERY | Facility: CLINIC | Age: 59
End: 2024-11-14
Payer: MEDICARE

## 2024-11-14 VITALS
DIASTOLIC BLOOD PRESSURE: 84 MMHG | HEIGHT: 65.35 IN | WEIGHT: 147 LBS | BODY MASS INDEX: 24.2 KG/M2 | HEART RATE: 61 BPM | SYSTOLIC BLOOD PRESSURE: 148 MMHG | TEMPERATURE: 97.2 F

## 2024-11-14 DIAGNOSIS — D64.9 ANEMIA, UNSPECIFIED: ICD-10-CM

## 2024-11-14 DIAGNOSIS — Z92.89 PERSONAL HISTORY OF OTHER MEDICAL TREATMENT: ICD-10-CM

## 2024-11-14 DIAGNOSIS — R19.7 DIARRHEA, UNSPECIFIED: ICD-10-CM

## 2024-11-14 DIAGNOSIS — R10.9 UNSPECIFIED ABDOMINAL PAIN: ICD-10-CM

## 2024-11-14 DIAGNOSIS — Z86.2 PERSONAL HISTORY OF DISEASES OF THE BLOOD AND BLOOD-FORMING ORGANS AND CERTAIN DISORDERS INVOLVING THE IMMUNE MECHANISM: ICD-10-CM

## 2024-11-14 DIAGNOSIS — Z82.49 FAMILY HISTORY OF ISCHEMIC HEART DISEASE AND OTHER DISEASES OF THE CIRCULATORY SYSTEM: ICD-10-CM

## 2024-11-14 PROCEDURE — 99213 OFFICE O/P EST LOW 20 MIN: CPT | Mod: 25

## 2024-11-14 PROCEDURE — 46600 DIAGNOSTIC ANOSCOPY SPX: CPT

## 2024-11-15 ENCOUNTER — NON-APPOINTMENT (OUTPATIENT)
Age: 59
End: 2024-11-15

## 2024-12-27 ENCOUNTER — NON-APPOINTMENT (OUTPATIENT)
Age: 59
End: 2024-12-27

## 2025-01-13 ENCOUNTER — APPOINTMENT (OUTPATIENT)
Dept: ORTHOPEDIC SURGERY | Facility: CLINIC | Age: 60
End: 2025-01-13
Payer: MEDICARE

## 2025-01-13 VITALS — HEIGHT: 65 IN | WEIGHT: 147 LBS | RESPIRATION RATE: 18 BRPM | BODY MASS INDEX: 24.49 KG/M2

## 2025-01-13 DIAGNOSIS — T84.82XD FIBROSIS DUE TO INTERNAL ORTHOPEDIC PROSTHETIC DEVICES, IMPLANTS AND GRAFTS, SUBSEQUENT ENCOUNTER: ICD-10-CM

## 2025-01-13 DIAGNOSIS — Z96.652 PRESENCE OF LEFT ARTIFICIAL KNEE JOINT: ICD-10-CM

## 2025-01-13 PROCEDURE — 99212 OFFICE O/P EST SF 10 MIN: CPT

## 2025-01-13 RX ORDER — CELECOXIB 100 MG/1
100 CAPSULE ORAL
Qty: 60 | Refills: 2 | Status: ACTIVE | COMMUNITY
Start: 2025-01-13 | End: 1900-01-01

## 2025-01-13 RX ORDER — DICLOFENAC SODIUM 10 MG/G
1 GEL TOPICAL
Qty: 2 | Refills: 0 | Status: ACTIVE | COMMUNITY
Start: 2025-01-13 | End: 1900-01-01

## 2025-07-16 ENCOUNTER — NON-APPOINTMENT (OUTPATIENT)
Age: 60
End: 2025-07-16

## (undated) DEVICE — DRAPE LIGHT HANDLE COVER (BLUE)

## (undated) DEVICE — WOUND IRR SURGIPHOR

## (undated) DEVICE — VENODYNE/SCD SLEEVE CALF MEDIUM

## (undated) DEVICE — WARMING BLANKET UPPER ADULT

## (undated) DEVICE — MAKO VIZADISC KNEE TRACKING KIT

## (undated) DEVICE — MAKO CHECKPOINT KIT FEMORAL / TIBIAL

## (undated) DEVICE — SYR ASEPTO

## (undated) DEVICE — SYR LUER LOK 30CC

## (undated) DEVICE — DRAPE U 47X51" LF STERILE

## (undated) DEVICE — SUT VICRYL 3-0 18" TIES UNDYED

## (undated) DEVICE — DRSG COBAN 6"

## (undated) DEVICE — DRSG ACE BANDAGE 6"

## (undated) DEVICE — SUCTION YANKAUER OPEN TIP NO VENT CURVE

## (undated) DEVICE — PACK TOTAL KNEE

## (undated) DEVICE — NDL HYPO SAFE 22G X 1" (BLACK)

## (undated) DEVICE — GLV 7.5 PROTEXIS ORTHO (CREAM)

## (undated) DEVICE — MAKO DRAPE KIT

## (undated) DEVICE — DRAPE U POLY BLUE 60X72"

## (undated) DEVICE — MARKING PEN W RULER

## (undated) DEVICE — GLV 8 PROTEXIS (WHITE)

## (undated) DEVICE — SAW BLADE STRYKER SAGITTAL DUAL CUT TEETH 35X64X.64MM

## (undated) DEVICE — MAKO BLADE NARROW

## (undated) DEVICE — PREP BETADINE KIT

## (undated) DEVICE — NDL HYPO SAFE 20G X 1.5" (YELLOW)

## (undated) DEVICE — GLV 8.5 PROTEXIS (WHITE)

## (undated) DEVICE — SUT QUILL MONODERM 2-0 16CM

## (undated) DEVICE — POSITIONER LEG WRAP STERILE

## (undated) DEVICE — SUT QUILL MONODERM 3-0 30CM 19MM

## (undated) DEVICE — SUT STRATAFIX SPIRAL PDO 0 24CM CP-2

## (undated) DEVICE — DRSG STOCKINETTE IMPERVIOUS XL

## (undated) DEVICE — DRSG WEBRIL 6"

## (undated) DEVICE — DRAPE LIGHT HANDLE COVER (GREEN)

## (undated) DEVICE — MAKO BLADE STANDARD

## (undated) DEVICE — GLV 7.5 PROTEXIS (WHITE)

## (undated) DEVICE — DRSG AQUACEL 3.5 X 10"

## (undated) DEVICE — MAKO STRYKER SILICONE RETRACTOR CORD

## (undated) DEVICE — TAPE SILK 3"